# Patient Record
Sex: MALE | Race: WHITE
[De-identification: names, ages, dates, MRNs, and addresses within clinical notes are randomized per-mention and may not be internally consistent; named-entity substitution may affect disease eponyms.]

---

## 2017-02-07 ENCOUNTER — HOSPITAL ENCOUNTER (EMERGENCY)
Dept: HOSPITAL 53 - M ED | Age: 32
Discharge: HOME | End: 2017-02-07
Payer: COMMERCIAL

## 2017-02-07 ENCOUNTER — HOSPITAL ENCOUNTER (EMERGENCY)
Dept: HOSPITAL 53 - M ED | Age: 32
LOS: 1 days | Discharge: HOME | End: 2017-02-08
Payer: COMMERCIAL
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
## 2017-02-07 DIAGNOSIS — S60.222D: Primary | ICD-10-CM

## 2017-02-07 DIAGNOSIS — Z79.899: ICD-10-CM
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
## 2017-02-07 DIAGNOSIS — Q35.9: ICD-10-CM

## 2017-02-07 DIAGNOSIS — F17.210: ICD-10-CM

## 2017-02-07 DIAGNOSIS — F32.9: ICD-10-CM

## 2017-02-07 DIAGNOSIS — G40.909: ICD-10-CM
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
## 2017-02-07 DIAGNOSIS — F41.9: ICD-10-CM
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
## 2017-02-07 DIAGNOSIS — S60.222A: ICD-10-CM

## 2017-02-07 DIAGNOSIS — Z88.8: ICD-10-CM

## 2017-02-07 DIAGNOSIS — S67.22XA: Primary | ICD-10-CM

## 2017-02-07 NOTE — EDDOCDS
Nurse's Notes                                                                                     

[facility]                                                                         

Name: Og Segal                                                                                  

Age: 31 yrs                                                                                       

Sex: Male                                                                                         

: 1985                                                                                   

MRN: [id number]                                                                                     

Arrival Date: 2017                                                                          

Time: 15:37                                                                                       

Account#: [id number]                                                                              

Bed I8 / 16                                                                                       

Private MD:                                                                                       

Diagnosis: Crushing injury of hand-left;Contusion of left hand                                    

                                                                                                  

Presentation:                                                                                     

                                                                                             

15:53 Presenting complaint: Patient states: did not answer on first call to triage.           hs1 

16:10 Presenting complaint: answered from outside where patient was smoking. Patient had a    hs1 

      cinder block accidentally fall on him. Adult Sepsis Screening: The patient does not         

      have new or worsening altered mentation. Patient's respiratory rate is less than 22.        

      Systolic blood pressure is greater than 100. Patient has a qSOFA score of 0- Negative       

      Sepsis Screen. Suicide/Homicide risk assessment- the patient denies having any suicidal     

      and/or homicidal ideations and does not present with any other emotional, behavioral or     

      mental health complaints.  Status: Patient is not a  or       

      dependent. Transition of care: patient was not received from another setting of care.       

16:10 Acuity: DAIN Level 4                                                                     hs1 

16:14 Method Of Arrival: Ambulance                                                            hs1 

                                                                                                  

Triage Assessment:                                                                                

16:13 General: Appears uncomfortable, Behavior is appropriate for age, cooperative. Pain:     hs1 

      Location: left hand Pain currently is 9 out of 10 on a pain scale. HIV screening NA for     

      this visit Offered previously. Musculoskeletal: Range of motion limited in MCP of left      

      ring finger, MCP of left middle finger and MCP of left index finger Swelling present in     

      left hand. Injury Description: Crush injury sustained to left hand.                         

                                                                                                  

Historical:                                                                                       

- Allergies: no known allergies;                                                                  

- Home Meds:                                                                                      

1. clonazepam 0.5 mg Oral tab 1 tab 2 times per day                                             

2. Prozac 40 mg Oral cap 1 cap once daily                                                       

3. Tylenol 325 mg Oral tab 1 tab every 4 hours                                                  

     (Last dose: 2017 14:00)                                                                

- PMHx: Anxiety; Cleft Palate; collapsed lung; Depression; Seizure Disorder;                      

- PSHx: Chest Tube- Left; cleft palate repair;                                                    

- Social history: Smoking status: Patient uses tobacco products, heavy tobacco smoker.            

No barriers to communication noted, The patient speaks fluent English, Speaks                   

appropriately for age.                                                                          

- Family history: Not pertinent.                                                                  

- : The pt / caregiver states he / she is not on anticoagulants. Home medication list             

is obtained from the patient.                                                                   

- Exposure Risk Screening:: None identified.                                                      

                                                                                                  

                                                                                                  

Screenin:04 Screening information is obtained from the patient. Fall risk: No risks identified.     ck1 

      Assistance ADL's: requires no assistance with activities of daily living. Abuse/DV          

      Screen: The patient / caregiver reports he/she is: not in a situation that causes fear,     

      pain or injury. Nutritional screening: No deficits noted. Advance Directives:               

      Currently, there is no health care proxy. home support is adequate.                         

                                                                                                  

Assessment:                                                                                       

17:04 General: Appears in no apparent distress, comfortable, Behavior is appropriate for age, ck1 

      cooperative. Pain: Location: left hand Pain currently is 10 out of 10 on a pain scale.      

      Neurological: Level of Consciousness is awake, alert, obeys commands. Derm: Skin is         

      pink, warm & dry. Musculoskeletal: Circulation, motion, and sensation intact Range of     

      motion intact in all extremities.                                                           

                                                                                                  

Vital Signs:                                                                                      

15:42  / 77; Pulse 98; Resp 16; Temp 98.9; Pulse Ox 99% ; Weight 68.04 kg; Height 5 ft. elp 

      1 in. (154.94 cm);                                                                          

17:23  / 73; Pulse 92; Resp 18; Temp 98.0; Pulse Ox 98% ; Pain 8/10;                    jam1

15:42 Body Mass Index 28.34 (68.04 kg, 154.94 cm)                                             elp 

                                                                                                  

Vitals:                                                                                           

15:42 Log In Time N/A - ambulance arrival.                                                    elp 

                                                                                                  

ED Course:                                                                                        

15:41 Patient visited by Ruthie Avitia PCA.                                                  elp 

15:41 Patient moved to Waiting                                                                elp 

15:42 Patient moved to Pre RCE                                                                elp 

16:11 Triage Initiated                                                                        hs1 

16:36 [facility]                                                            le  

16:36 Patient moved to I8 / 16                                                                kcs 

16:39 Patient visited by Melly Almanza,ECHO.                                              ck1 

16:40 Patient visited by Chantal Huang FNP.                                                 le  

16:40 Patient visited by Chantal Huang FNP.                                                 le  

17:03 Patient visited by Melly Almanza,ECHO.                                              ck1 

17:04 The patient / caregiver is instructed regarding the plan of care and ED course.         ck1 

17:04 No IV's were initiated during this patient's visit. No procedures done that require     ck1 

      assistance.                                                                                 

17:20 Hand, Complete Returned.                                                                EDMS

17:42 Your own Physician is Referral Physician.                                               bobo  

17:50 El Rodriguez DO is Attending Physician.                                               cs11

17:54 Ace wrap to left hand. Patient has positive distal pulse, brisk capillary refill, and   jc4 

      positive sensation after application.                                                       

                                                                                                  

Administered Medications:                                                                         

17:04 Drug: morphine 5 mg [morphine 10 mg/mL injection solution (0.5 mL)] Route: IM; Site:    ck1 

      right deltoid;                                                                              

                                                                                                  

                                                                                                  

Order Results:                                                                                    

                                                                                                  

Radiology Order: Hand, Complete                                                                   

      Test: Hand, Complete                                                                        

      REASON FOR EXAMINATION: crush inj, metacarpals;Trauma; Left hand four views:; ; There       

      is soft tissue edema over the dorsum.; ; There is no fracture or dislocation. No            

      calcifications or foreign bodies.; ; ; Signed by; Chace Traore MD 2017 04:59 P;      

Outcome:                                                                                          

17:43 Discharge ordered by Provider.                                                          le  

17:55 Discharge Assessment: Patient awake, alert and oriented x 3. No cognitive and/or        jc4 

      functional deficits noted. Patient verbalized understanding of disposition                  

      instructions. patient administered narcotics - yes. Pt provided with safe discharge.        

      The following High Risk Discharge criteria are identified: None. Discharged to home         

      ambulatory, via [place]. Condition: stable. Discharge instructions given to patient,     

      Instructed on discharge instructions, follow up and referral plans. medication usage,       

      no driving heavy equipment, Demonstrated understanding of instructions, medications, Pt     

      was receptive of discharge instructions/ teaching. No special radiology studies were        

      completed. Property :Personal belongings accompany Pt.                                      

17:59 Patient left the ED.                                                                    jc4 

                                                                                                  

Signatures:                                                                                       

Dispatcher [employer]                           EDMS                                                 

Kristen Portillo RN                      RN   Marta Warner, PCA                       PCA  jam1                                                 

Melly AlmanzaRN                  RN   ck1                                                  

Chantal Huang, FNP                     FNP  Tika Morrison RN                    RN   hs1                                                  

Toshia García RN                    RN   jc4                                                  

El Rodriguez DO DO   cs11                                                 

Patchen, Ruthie, PCA                      PCA  elp                                                  

                                                                                                  

Corrections: (The following items were deleted from the chart)                                    

16:14 16:10 Method Of Arrival: Walkin/Carried/Asstd hs1                                       hs1 

                                                                                                  

**************************************************************************************************

MTDD

## 2017-02-07 NOTE — EDDOCDS
Physician Documentation                                                                           

[facility]                                                                         

Name: Og Segal                                                                                  

Age: 31 yrs                                                                                       

Sex: Male                                                                                         

: 1985                                                                                   

MRN: [id number]                                                                                     

Arrival Date: 2017                                                                          

Time: 15:37                                                                                       

Account#: [id number]                                                                              

Bed I8 / 16                                                                                       

Private MD:                                                                                       

Disposition:                                                                                      

17 17:43 Discharged to Home/Self Care. Impression: Crushing injury of hand - left,          

Contusion of left hand.                                                                         

- Condition is Stable.                                                                            

- Discharge Instructions: Elastic Bandage and RICE, Hand Contusion, Crush Injury,                 

Fingers or Toes.                                                                                

- Prescriptions for oxycodone 5 mg Oral Tablet - take 1 tablet by ORAL route every 4              

hours As needed MDD: 6 tabs; 20 tablet.                                                         

- Medication Reconciliation, Local Pharmacy Hours form.                                           

- Follow up: Your own Physician; When: 1 week; Reason: Recheck today's complaints,                

Continuance of care, If not improving.                                                          

- Problem is new.                                                                                 

- Symptoms have improved.                                                                         

                                                                                                  

                                                                                                  

                                                                                                  

Historical:                                                                                       

- Allergies: no known allergies;                                                                  

- Home Meds:                                                                                      

1. clonazepam 0.5 mg Oral tab 1 tab 2 times per day                                             

2. Prozac 40 mg Oral cap 1 cap once daily                                                       

3. Tylenol 325 mg Oral tab 1 tab every 4 hours                                                  

     (Last dose: 2017 14:00)                                                                

- PMHx: Anxiety; Cleft Palate; collapsed lung; Depression; Seizure Disorder;                      

- PSHx: Chest Tube- Left; cleft palate repair;                                                    

- Social history: Smoking status: Patient uses tobacco products, heavy tobacco smoker.            

No barriers to communication noted, The patient speaks fluent English, Speaks                   

appropriately for age.                                                                          

- Family history: Not pertinent.                                                                  

- : The pt / caregiver states he / she is not on anticoagulants. Home medication list             

is obtained from the patient.                                                                   

- Exposure Risk Screening:: None identified.                                                      

                                                                                                  

                                                                                                  

Vital Signs:                                                                                      

                                                                                             

15:42  / 77; Pulse 98; Resp 16; Temp 98.9; Pulse Ox 99% ; Weight 68.04 kg / 150 lbs;    elp 

      Height 5 ft. 1 in. (154.94 cm);                                                             

17:23  / 73; Pulse 92; Resp 18; Temp 98.0; Pulse Ox 98% ; Pain 8/10;                    jam1

15:42 Body Mass Index 28.34 (68.04 kg, 154.94 cm)                                             elp 

                                                                                                  

MDM:                                                                                              

16:46 morphine 5 mg IM once ordered.                                                          le  

16:49 Hand, Complete Ordered.                                                                 EDMS

17:48 Financial registration complete.                                                        ks16

17:54 Ace Wrap ordered.                                                                       jc4 

                                                                                                  

Administered Medications:                                                                         

17:04 Drug: morphine 5 mg [morphine 10 mg/mL injection solution (0.5 mL)] Route: IM; Site:    ck1 

      right deltoid;                                                                              

                                                                                                  

                                                                                                  

Signatures:                                                                                       

Dispatcher [employer]                           EDMS                                                 

Melly Almanza RN                  RN   ck1                                                  

Chantal Huang, EVERETTP                     FNP  Tika Morrison RN                    RN   hs1                                                  

Toshia García RN                    RN   jc4                                                  

Cira Blake, Reg                 Reg  ks16                                                 

                                                                                                  

**************************************************************************************************

MTDD

## 2017-02-07 NOTE — REP
Left hand four views:

 

There is soft tissue edema over the dorsum.

 

There is no fracture or dislocation.  No calcifications or foreign bodies.

 

 

Signed by

Chace Traore MD 02/07/2017 04:59 P

## 2017-02-08 NOTE — EDDOCDS
Nurse's Notes                                                                                     

[facility]                                                                         

Name: Og Segal                                                                                  

Age: 31 yrs                                                                                       

Sex: Male                                                                                         

: 1985                                                                                   

MRN: [id number]                                                                                     

Arrival Date: 2017                                                                          

Time: 23:47                                                                                       

Account#: [id number]                                                                              

Bed D1                                                                                            

Private MD: Luciano Veronica                                                                          

Diagnosis: Contusion of left hand                                                                 

                                                                                                  

Presentation:                                                                                     

                                                                                             

23:56 Presenting complaint: Patient states: seen here earlier and diagnosed with chipped      dsf 

      fractures. Since discharge pt states the swelling and pain has gotten worse. pt did not     

       pain medication from pharmacy today because he had no ride. Adult Sepsis            

      Screening: The patient does not have new or worsening altered mentation. Patient's          

      respiratory rate is less than 22. Systolic blood pressure is greater than 100. Patient      

      has a qSOFA score of 0- Negative Sepsis Screen. Suicide/Homicide risk assessment- the       

      patient denies having any suicidal and/or homicidal ideations and does not present with     

      any other emotional, behavioral or mental health complaints.  Status: Patient       

      is not a  or  dependent. Transition of care: patient was not          

      received from another setting of care.                                                      

23:56 Acuity: DAIN Level 4                                                                     dsf 

23:56 Method Of Arrival: Walkin/Carried/Asstd                                                 dsf 

                                                                                                  

Triage Assessment:                                                                                

23:59 General: Appears in no apparent distress, Behavior is appropriate for age, cooperative. dsf 

      Pain: Location: left hand Pain currently is 9 out of 10 on a pain scale. Quality of         

      pain is described as unbearable. HIV screening NA for this visit Offered previously.        

      Musculoskeletal: redness and swelling to left hand.                                         

                                                                                                  

Historical:                                                                                       

- Allergies: Toradol (Vomit);                                                                     

- Home Meds:                                                                                      

1. clonazepam 0.5 mg Oral tab 1 tab 2 times per day                                             

     (Last dose: 2017 09:00)                                                                

2. Prozac 40 mg Oral cap 1 cap once daily                                                       

     (Last dose: 2017 07:00)                                                                

3. Tylenol 325 mg Oral tab 1 tab every 4 hours                                                  

     (Last dose: Unknown)                                                                         

4. oxycodone 5 mg Oral tab 1 tab every 4 hours unable to  from the pharmacy              

- PMHx: Anxiety; Cleft Palate; collapsed lung; Depression; Seizure Disorder;                      

- PSHx: Chest Tube- Left; cleft palate repair;                                                    

- Social history: Smoking status: Patient uses tobacco products, current every day                

smoker. No barriers to communication noted, The patient speaks fluent English, Speaks           

appropriately for age.                                                                          

- Family history: Not pertinent.                                                                  

- : The pt / caregiver states he / she is not on anticoagulants. Home medication list             

is obtained from the patient.                                                                   

- Exposure Risk Screening:: None identified.                                                      

                                                                                                  

                                                                                                  

Screenin                                                                                             

01:57 Screening information is obtained from the patient. Fall risk: No risks identified.     mlc 

      Assistance ADL's: requires no assistance with activities of daily living. Abuse/DV          

      Screen: The patient / caregiver reports he/she is: not in a situation that causes fear,     

      pain or injury. Nutritional screening: No deficits noted. Advance Directives:               

      Currently, there is no health care proxy. home support is adequate.                         

                                                                                                  

Assessment:                                                                                       

01:57 General: Appears in no apparent distress, comfortable, Behavior is cooperative. Pain:   mlc 

      Location: left hand. Neurological: Level of Consciousness is awake, alert, Oriented to      

      person, place, time. Respiratory: Airway is patent Respiratory effort is even,              

      unlabored, Respiratory pattern is regular. Derm: Skin is pink, warm & dry. Swollen area   

      noted on dorsum of left hand. Musculoskeletal: Circulation, motion, and sensation           

      intact Capillary refill < 3 seconds in left fingers.                                        

                                                                                                  

Vital Signs:                                                                                      

                                                                                             

23:49  / 78; Pulse 99; Resp 18 S; Temp 98.3(O); Pulse Ox 97% on R/A; Weight 68.95 kg    gr2 

      (R); Height 5 ft. 2 in. (157.48 cm) (R); Pain 8/10;                                         

23:49 Body Mass Index 27.80 (68.95 kg, 157.48 cm)                                             gr2 

                                                                                                  

Vitals:                                                                                           

23:49 Log In Time: 2017 at 23:49.                                                gr2 

                                                                                                  

ED Course:                                                                                        

23:49 Patient visited by Gogo Nance.                                                   gr2 

23:49 Luciano Veronica is Private Physician.                                                      gr2 

23:49 Patient moved to Waiting                                                                gr2 

23:50 Patient visited by Gogo Nance.                                                   gr2 

23:50 Patient moved to Pre RCE                                                                gr2 

23:57 Triage Initiated                                                                        dsf 

02                                                                                             

00:17 Patient moved to MTA Wait                                                               dsf 

01:18 [facility] Payment Agreement was scanned into [employer] and attached to record.               hs2 

01:21 Patient moved to I5 / M5                                                                ajs 

01:35 Garrett Carter RPA-C is PHCP.                                                   ck7 

01:35 Mario Houston MD is Attending Physician.                                               ck7 

01:35 Patient visited by Garrett Carter RPA-C.                                        ck7 

01:43 [facility] is Referral Physician.                                  ck7 

01:57 The patient / caregiver is instructed regarding the plan of care and ED course.         mlc 

01:57 No IV's were initiated during this patient's visit. No procedures done that require     mlc 

      assistance.                                                                                 

02:21 Patient moved to D1                                                                     mlc 

                                                                                                  

Administered Medications:                                                                         

01:50 Drug: oxyCODONE 5 mg [oxycodone 5 mg tablet (1 tabs)] Route: PO;                        mlc 

02:45 Follow up: Response: Med's dispensed home                                               mlc 

02:44 CANCELLED (Other Intervention Used): oxyCODONE 5 mg PO once                             ck7 

                                                                                                  

                                                                                                  

Order Results:                                                                                    

There are currently no results for this order.                                                    

Outcome:                                                                                          

01:43 Discharge ordered by Provider.                                                          ck7 

01:57 Discharge Assessment: Patient awake, alert and oriented x 3. No cognitive and/or        mlc 

      functional deficits noted. Patient verbalized understanding of disposition                  

      instructions. patient administered narcotics - yes. Pt provided with safe discharge.        

      The following High Risk Discharge criteria are identified: None. Discharged to home         

      ambulatory. Condition: stable. Discharge instructions given to patient, Instructed on       

      discharge instructions, follow up and referral plans. Demonstrated understanding of         

      instructions, Pt was receptive of discharge instructions/ teaching. No special              

      radiology studies were completed. Property sent home with patient.                          

02:45 Patient left the ED.                                                                    [facility] 

                                                                                                  

Signatures:                                                                                       

Senia Sibley RN                       RN   Cathleen Nieto Christopher, RPA-C            [facility]Cck[facility]                                                  

Gogo Nance                            gr2                                                  

Roya Denny RN                          RN   Myrtle Pinto, Reg                   Reg  hs2                                                  

                                                                                                  

Corrections: (The following items were deleted from the chart)                                    

00:11 02/07 23:56 Presenting complaint: Patient states: seen here earlier and diagnosed with  dsf 

      chipped fractures. Since discharge pt states the swelling and pain has gotten worse dsf     

                                                                                                  

**************************************************************************************************

MTDD

## 2017-02-08 NOTE — EDDOCDS
Physician Documentation                                                                           

[facility]                                                                         

Name: Og Segal                                                                                  

Age: 31 yrs                                                                                       

Sex: Male                                                                                         

: 1985                                                                                   

MRN: [id number]                                                                                     

Arrival Date: 2017                                                                          

Time: 23:47                                                                                       

Account#: [id number]                                                                              

Bed D1                                                                                            

Private MD: Luciano Veronica                                                                          

Disposition:                                                                                      

17 01:43 Discharged to Home/Self Care. Impression: Contusion of left hand.                  

- Condition is Stable.                                                                            

- Discharge Instructions: Hand Contusion.                                                         

                                                                                                  

- Medication Reconciliation, Local Pharmacy Hours, Work Release Form - 2 day form.                

- Follow up: [facility]; When: 1 - 2 days; Reason: Recheck today's           

complaints, Continuance of care.                                                                

- Problem is new.                                                                                 

- Symptoms have improved.                                                                         

- Notes: USE THE PAIN MEDICATION THAT WAS PRESCRIBED TO YOU EARLIER AS INSTRUCTED, PICK           

UP WITH SCRIPT AT THE PHARMACY TOMORROW, USE THE ACE WRAP THAT WAS GIVEN TO YOU, USE            

ICE TO THE AREA, FOLLOW UP WITH [facility]                                       

                                                                                                  

                                                                                                  

Historical:                                                                                       

- Allergies: Toradol (Vomit);                                                                     

- Home Meds:                                                                                      

1. clonazepam 0.5 mg Oral tab 1 tab 2 times per day                                             

     (Last dose: 2017 09:00)                                                                

2. Prozac 40 mg Oral cap 1 cap once daily                                                       

     (Last dose: 2017 07:00)                                                                

3. Tylenol 325 mg Oral tab 1 tab every 4 hours                                                  

     (Last dose: Unknown)                                                                         

4. oxycodone 5 mg Oral tab 1 tab every 4 hours unable to  from the pharmacy              

- PMHx: Anxiety; Cleft Palate; collapsed lung; Depression; Seizure Disorder;                      

- PSHx: Chest Tube- Left; cleft palate repair;                                                    

- Social history: Smoking status: Patient uses tobacco products, current every day                

smoker. No barriers to communication noted, The patient speaks fluent English, Speaks           

appropriately for age.                                                                          

- Family history: Not pertinent.                                                                  

- : The pt / caregiver states he / she is not on anticoagulants. Home medication list             

is obtained from the patient.                                                                   

- Exposure Risk Screening:: None identified.                                                      

                                                                                                  

                                                                                                  

Vital Signs:                                                                                      

                                                                                             

23:49  / 78; Pulse 99; Resp 18 S; Temp 98.3(O); Pulse Ox 97% on R/A; Weight 68.95 kg /  gr2 

      152.01 lbs (R); Height 5 ft. 2 in. (157.48 cm) (R); Pain 8/10;                              

23:49 Body Mass Index 27.80 (68.95 kg, 157.48 cm)                                             gr2 

                                                                                                  

MDM:                                                                                              

                                                                                             

01:18 [facility] Payment Agreement was scanned into [employer] and attached to record.               hs2 

01:37 Financial registration complete.                                                        hs2 

02:44 oxyCODONE 5 mg PO once; DISPENSE TO GO HOME WITH ordered.                               ck7 

                                                                                                  

Administered Medications:                                                                         

01:50 Drug: oxyCODONE 5 mg [oxycodone 5 mg tablet (1 tabs)] Route: PO;                        mlc 

02:45 Follow up: Response: Med's dispensed home                                               mlc 

02:44 CANCELLED (Other Intervention Used): oxyCODONE 5 mg PO once                             ck7 

                                                                                                  

                                                                                                  

Signatures:                                                                                       

Senia SibleyRN                       RN   Garrett Rutherford RPA-C            R[facility]-Cck7                                                  

Roya DennyRN                          RN   mlc                                                  

Myrtle Trejo, Reg                   Reg  hs2                                                  

                                                                                                  

The chart was reviewed and I authenticate all verbal orders and agree with the evaluation and 
treatment provided.Corrections: (The following items were deleted from the chart)

02:44 01:42 oxyCODONE 5 mg PO once ordered. ck7                                               ck7 

02:44 02:44 oxyCODONE 5 mg PO once ordered. ck7                                               ck7 

                                                                                                  

Attachments:                                                                                      

01:18 [facility] Payment Agreement                                                                hs2 

                                                                                                  

**************************************************************************************************

MTDD

## 2017-02-09 NOTE — EDDOCDS
Physician Documentation                                                                           

[facility]                                                                         

Name: Og Segal                                                                                  

Age: 31 yrs                                                                                       

Sex: Male                                                                                         

: 1985                                                                                   

MRN: [id number]                                                                                     

Arrival Date: 2017                                                                          

Time: 15:37                                                                                       

Account#: [id number]                                                                              

Bed I8 / 16                                                                                       

Private MD:                                                                                       

Disposition:                                                                                      

17 17:43 Discharged to Home/Self Care. Impression: Crushing injury of hand - left,          

Contusion of left hand.                                                                         

- Condition is Stable.                                                                            

- Discharge Instructions: Elastic Bandage and RICE, Hand Contusion, Crush Injury,                 

Fingers or Toes.                                                                                

- Prescriptions for oxycodone 5 mg Oral Tablet - take 1 tablet by ORAL route every 4              

hours As needed MDD: 6 tabs; 20 tablet.                                                         

- Medication Reconciliation, Local Pharmacy Hours form.                                           

- Follow up: Your own Physician; When: 1 week; Reason: Recheck today's complaints,                

Continuance of care, If not improving.                                                          

- Problem is new.                                                                                 

- Symptoms have improved.                                                                         

                                                                                                  

                                                                                                  

                                                                                                  

Historical:                                                                                       

- Allergies: no known allergies;                                                                  

- Home Meds:                                                                                      

1. clonazepam 0.5 mg Oral tab 1 tab 2 times per day                                             

2. Prozac 40 mg Oral cap 1 cap once daily                                                       

3. Tylenol 325 mg Oral tab 1 tab every 4 hours                                                  

     (Last dose: 2017 14:00)                                                                

- PMHx: Anxiety; Cleft Palate; collapsed lung; Depression; Seizure Disorder;                      

- PSHx: Chest Tube- Left; cleft palate repair;                                                    

- Social history: Smoking status: Patient uses tobacco products, heavy tobacco smoker.            

No barriers to communication noted, The patient speaks fluent English, Speaks                   

appropriately for age.                                                                          

- Family history: Not pertinent.                                                                  

- : The pt / caregiver states he / she is not on anticoagulants. Home medication list             

is obtained from the patient.                                                                   

- Exposure Risk Screening:: None identified.                                                      

                                                                                                  

                                                                                                  

Vital Signs:                                                                                      

                                                                                             

15:42  / 77; Pulse 98; Resp 16; Temp 98.9; Pulse Ox 99% ; Weight 68.04 kg / 150 lbs;    elp 

      Height 5 ft. 1 in. (154.94 cm);                                                             

17:23  / 73; Pulse 92; Resp 18; Temp 98.0; Pulse Ox 98% ; Pain 8/10;                    jam1

15:42 Body Mass Index 28.34 (68.04 kg, 154.94 cm)                                             elp 

                                                                                                  

MDM:                                                                                              

16:46 morphine 5 mg IM once ordered.                                                          le  

16:49 Hand, Complete Ordered.                                                                 EDMS

17:48 Financial registration complete.                                                        [facility]

17:54 Ace Wrap ordered.                                                                       jc4 

20:13 [facility] Payment Agreement was scanned into [employer] and attached to record.               ks                                                                                             

13:39 T-Sheet-- Draft Copy was scanned into [employer] and attached to record.                   gb  

                                                                                                  

Administered Medications:                                                                         

                                                                                             

17:04 Drug: morphine 5 mg [morphine 10 mg/mL injection solution (0.5 mL)] Route: IM; Site:    ck1 

      right deltoid;                                                                              

                                                                                                  

                                                                                                  

Signatures:                                                                                       

Dispatcher [employer]                           EDMS                                                 

Susy Del Real, Reg                  Reg  gb                                                   

Melly AlmanzaRN                  RN   ck1                                                  

Chantal Huang, EVERETTP                     FNP  Tika Morrison RN                    RN   hs1                                                  

Toshia García RN                    RN   jc4                                                  

Cira Blake, Reg                 Reg  ks16                                                 

                                                                                                  

The chart was reviewed and I authenticate all verbal orders and agree with the evaluation and 
treatment provided.Attachments:

20:13 [facility] Payment Agreement                                                                [facility]

                                                                                             

13:39 T-Sheet-- Draft Copy                                                                    gb  

                                                                                                  

**************************************************************************************************



*** Chart Complete ***
MTDD

## 2017-02-09 NOTE — EDDOCDS
Nurse's Notes                                                                                     

[facility]                                                                         

Name: Og Segal                                                                                  

Age: 31 yrs                                                                                       

Sex: Male                                                                                         

: 1985                                                                                   

MRN: [id number]                                                                                     

Arrival Date: 2017                                                                          

Time: 15:37                                                                                       

Account#: [id number]                                                                              

Bed I8 / 16                                                                                       

Private MD:                                                                                       

Diagnosis: Crushing injury of hand-left;Contusion of left hand                                    

                                                                                                  

Presentation:                                                                                     

                                                                                             

15:53 Presenting complaint: Patient states: did not answer on first call to triage.           hs1 

16:10 Presenting complaint: answered from outside where patient was smoking. Patient had a    hs1 

      cinder block accidentally fall on him. Adult Sepsis Screening: The patient does not         

      have new or worsening altered mentation. Patient's respiratory rate is less than 22.        

      Systolic blood pressure is greater than 100. Patient has a qSOFA score of 0- Negative       

      Sepsis Screen. Suicide/Homicide risk assessment- the patient denies having any suicidal     

      and/or homicidal ideations and does not present with any other emotional, behavioral or     

      mental health complaints.  Status: Patient is not a  or       

      dependent. Transition of care: patient was not received from another setting of care.       

16:10 Acuity: DAIN Level 4                                                                     hs1 

16:14 Method Of Arrival: Ambulance                                                            hs1 

                                                                                                  

Triage Assessment:                                                                                

16:13 General: Appears uncomfortable, Behavior is appropriate for age, cooperative. Pain:     hs1 

      Location: left hand Pain currently is 9 out of 10 on a pain scale. HIV screening NA for     

      this visit Offered previously. Musculoskeletal: Range of motion limited in MCP of left      

      ring finger, MCP of left middle finger and MCP of left index finger Swelling present in     

      left hand. Injury Description: Crush injury sustained to left hand.                         

                                                                                                  

Historical:                                                                                       

- Allergies: no known allergies;                                                                  

- Home Meds:                                                                                      

1. clonazepam 0.5 mg Oral tab 1 tab 2 times per day                                             

2. Prozac 40 mg Oral cap 1 cap once daily                                                       

3. Tylenol 325 mg Oral tab 1 tab every 4 hours                                                  

     (Last dose: 2017 14:00)                                                                

- PMHx: Anxiety; Cleft Palate; collapsed lung; Depression; Seizure Disorder;                      

- PSHx: Chest Tube- Left; cleft palate repair;                                                    

- Social history: Smoking status: Patient uses tobacco products, heavy tobacco smoker.            

No barriers to communication noted, The patient speaks fluent English, Speaks                   

appropriately for age.                                                                          

- Family history: Not pertinent.                                                                  

- : The pt / caregiver states he / she is not on anticoagulants. Home medication list             

is obtained from the patient.                                                                   

- Exposure Risk Screening:: None identified.                                                      

                                                                                                  

                                                                                                  

Screenin:04 Screening information is obtained from the patient. Fall risk: No risks identified.     ck1 

      Assistance ADL's: requires no assistance with activities of daily living. Abuse/DV          

      Screen: The patient / caregiver reports he/she is: not in a situation that causes fear,     

      pain or injury. Nutritional screening: No deficits noted. Advance Directives:               

      Currently, there is no health care proxy. home support is adequate.                         

                                                                                                  

Assessment:                                                                                       

17:04 General: Appears in no apparent distress, comfortable, Behavior is appropriate for age, ck1 

      cooperative. Pain: Location: left hand Pain currently is 10 out of 10 on a pain scale.      

      Neurological: Level of Consciousness is awake, alert, obeys commands. Derm: Skin is         

      pink, warm & dry. Musculoskeletal: Circulation, motion, and sensation intact Range of     

      motion intact in all extremities.                                                           

                                                                                                  

Vital Signs:                                                                                      

15:42  / 77; Pulse 98; Resp 16; Temp 98.9; Pulse Ox 99% ; Weight 68.04 kg; Height 5 ft. elp 

      1 in. (154.94 cm);                                                                          

17:23  / 73; Pulse 92; Resp 18; Temp 98.0; Pulse Ox 98% ; Pain 8/10;                    jam1

15:42 Body Mass Index 28.34 (68.04 kg, 154.94 cm)                                             elp 

                                                                                                  

Vitals:                                                                                           

15:42 Log In Time N/A - ambulance arrival.                                                    elp 

                                                                                                  

ED Course:                                                                                        

15:41 Patient visited by Ruthie Avitia PCA.                                                  elp 

15:41 Patient moved to Waiting                                                                elp 

15:42 Patient moved to Pre RCE                                                                elp 

16:11 Triage Initiated                                                                        hs1 

16:36 [facility]                                                            le  

16:36 Patient moved to I8 / 16                                                                kcs 

16:39 Patient visited by Melly Almanza,ECHO.                                              ck1 

16:40 Patient visited by Chantal Huang FNP.                                                 le  

16:40 Patient visited by Chantal Huang FNP.                                                 le  

17:03 Patient visited by Melly Almazna,ECHO.                                              ck1 

17:04 The patient / caregiver is instructed regarding the plan of care and ED course.         ck1 

17:04 No IV's were initiated during this patient's visit. No procedures done that require     ck1 

      assistance.                                                                                 

17:20 Hand, Complete Returned.                                                                EDMS

17:42 Your own Physician is Referral Physician.                                               le  

17:50 El Rodriguez DO is Attending Physician.                                               cs11

17:54 Ace wrap to left hand. Patient has positive distal pulse, brisk capillary refill, and   jc4 

      positive sensation after application.                                                       

20:13 [facility] Payment Agreement was scanned into [employer] and attached to record.               ks16

                                                                                             

13:39 T-Sheet-- Draft Copy was scanned into [employer] and attached to record.                   gb  

                                                                                                  

Administered Medications:                                                                         

                                                                                             

17:04 Drug: morphine 5 mg [morphine 10 mg/mL injection solution (0.5 mL)] Route: IM; Site:    ck1 

      right deltoid;                                                                              

                                                                                                  

                                                                                                  

Order Results:                                                                                    

                                                                                                  

Radiology Order: Hand, Complete                                                                   

      Test: Hand, Complete                                                                        

      REASON FOR EXAMINATION: crush inj, metacarpals;Trauma; Left hand four views:; ; There       

      is soft tissue edema over the dorsum.; ; There is no fracture or dislocation. No            

      calcifications or foreign bodies.; ; ; Signed by; Chace Traore MD 2017 04:59 P;      

Outcome:                                                                                          

17:43 Discharge ordered by Provider.                                                          le  

17:55 Discharge Assessment: Patient awake, alert and oriented x 3. No cognitive and/or        jc4 

      functional deficits noted. Patient verbalized understanding of disposition                  

      instructions. patient administered narcotics - yes. Pt provided with safe discharge.        

      The following High Risk Discharge criteria are identified: None. Discharged to home         

      ambulatory, via [place]. Condition: stable. Discharge instructions given to patient,     

      Instructed on discharge instructions, follow up and referral plans. medication usage,       

      no driving heavy equipment, Demonstrated understanding of instructions, medications, Pt     

      was receptive of discharge instructions/ teaching. No special radiology studies were        

      completed. Property :Personal belongings accompany Pt.                                      

17:59 Patient left the ED.                                                                    jc4 

                                                                                                  

Signatures:                                                                                       

Dispatcher [employer]                           EDMS                                                 

Kristen Portillo RN                      RN   Marta Warner, PCA                       PCA  jam1                                                 

Susy Del Real, Reg                  Reg  Melly BooRN                  RN   ck1                                                  

Chantal Huang, FNP                     FNP  Tika Morrison RN                    RN   hs1                                                  

Toshia García RN                    RN   jc4                                                  

El Rodriguez DO                       DO   cs11                                                 

Ruthie Avitia, PCA                      PCA  elp                                                  

Cira Blake, Reg                 Reg  ks16                                                 

                                                                                                  

Corrections: (The following items were deleted from the chart)                                    

16:14 16:10 Method Of Arrival: Walkin/Carried/Asstd hs1                                       hs1 

                                                                                                  

**************************************************************************************************



*** Chart Complete ***
MTDD

## 2017-02-09 NOTE — EDDOCDS
Physician Documentation                                                                           

[facility]                                                                         

Name: Og Segal                                                                                  

Age: 31 yrs                                                                                       

Sex: Male                                                                                         

: 1985                                                                                   

MRN: [id number]                                                                                     

Arrival Date: 2017                                                                          

Time: 15:37                                                                                       

Account#: [id number]                                                                              

Bed I8 / 16                                                                                       

Private MD:                                                                                       

Disposition:                                                                                      

17 17:43 Discharged to Home/Self Care. Impression: Crushing injury of hand - left,          

Contusion of left hand.                                                                         

- Condition is Stable.                                                                            

- Discharge Instructions: Elastic Bandage and RICE, Hand Contusion, Crush Injury,                 

Fingers or Toes.                                                                                

- Prescriptions for oxycodone 5 mg Oral Tablet - take 1 tablet by ORAL route every 4              

hours As needed MDD: 6 tabs; 20 tablet.                                                         

- Medication Reconciliation, Local Pharmacy Hours form.                                           

- Follow up: Your own Physician; When: 1 week; Reason: Recheck today's complaints,                

Continuance of care, If not improving.                                                          

- Problem is new.                                                                                 

- Symptoms have improved.                                                                         

                                                                                                  

                                                                                                  

                                                                                                  

Historical:                                                                                       

- Allergies: no known allergies;                                                                  

- Home Meds:                                                                                      

1. clonazepam 0.5 mg Oral tab 1 tab 2 times per day                                             

2. Prozac 40 mg Oral cap 1 cap once daily                                                       

3. Tylenol 325 mg Oral tab 1 tab every 4 hours                                                  

     (Last dose: 2017 14:00)                                                                

- PMHx: Anxiety; Cleft Palate; collapsed lung; Depression; Seizure Disorder;                      

- PSHx: Chest Tube- Left; cleft palate repair;                                                    

- Social history: Smoking status: Patient uses tobacco products, heavy tobacco smoker.            

No barriers to communication noted, The patient speaks fluent English, Speaks                   

appropriately for age.                                                                          

- Family history: Not pertinent.                                                                  

- : The pt / caregiver states he / she is not on anticoagulants. Home medication list             

is obtained from the patient.                                                                   

- Exposure Risk Screening:: None identified.                                                      

                                                                                                  

                                                                                                  

Vital Signs:                                                                                      

                                                                                             

15:42  / 77; Pulse 98; Resp 16; Temp 98.9; Pulse Ox 99% ; Weight 68.04 kg / 150 lbs;    elp 

      Height 5 ft. 1 in. (154.94 cm);                                                             

17:23  / 73; Pulse 92; Resp 18; Temp 98.0; Pulse Ox 98% ; Pain 8/10;                    jam1

15:42 Body Mass Index 28.34 (68.04 kg, 154.94 cm)                                             elp 

                                                                                                  

MDM:                                                                                              

16:46 morphine 5 mg IM once ordered.                                                          le  

16:49 Hand, Complete Ordered.                                                                 EDMS

17:48 Financial registration complete.                                                        [facility]

17:54 Ace Wrap ordered.                                                                       jc4 

20:13 [facility] Payment Agreement was scanned into [employer] and attached to record.               ks                                                                                             

13:39 T-Sheet-- Draft Copy was scanned into [employer] and attached to record.                   gb  

                                                                                                  

Administered Medications:                                                                         

                                                                                             

17:04 Drug: morphine 5 mg [morphine 10 mg/mL injection solution (0.5 mL)] Route: IM; Site:    ck1 

      right deltoid;                                                                              

                                                                                                  

                                                                                                  

Signatures:                                                                                       

Dispatcher [employer]                           EDMS                                                 

Susy Del Real, Reg                  Reg  gb                                                   

Melly AlmanzaRN                  RN   ck1                                                  

Chantal Huang, EVERETTP                     FNP  Tika Morrison RN                    RN   hs1                                                  

Toshia García RN                    RN   jc4                                                  

Cira Blake, Reg                 Reg  ks16                                                 

                                                                                                  

The chart was reviewed and I authenticate all verbal orders and agree with the evaluation and 
treatment provided.Attachments:

20:13 [facility] Payment Agreement                                                                [facility]

                                                                                             

13:39 T-Sheet-- Draft Copy                                                                    gb  

                                                                                                  

**************************************************************************************************



*** Chart Complete ***
MTDD

## 2017-02-10 NOTE — EDDOCDS
Physician Documentation                                                                           

[facility]                                                                         

Name: Og Segal                                                                                  

Age: 31 yrs                                                                                       

Sex: Male                                                                                         

: 1985                                                                                   

MRN: [id number]                                                                                     

Arrival Date: 2017                                                                          

Time: 23:47                                                                                       

Account#: [id number]                                                                              

Bed D1                                                                                            

Private MD: Luciano Veronica                                                                          

Disposition:                                                                                      

17 01:43 Discharged to Home/Self Care. Impression: Contusion of left hand.                  

- Condition is Stable.                                                                            

- Discharge Instructions: Hand Contusion.                                                         

                                                                                                  

- Medication Reconciliation, Local Pharmacy Hours, Work Release Form - 2 day form.                

- Follow up: [facility]; When: 1 - 2 days; Reason: Recheck today's           

complaints, Continuance of care.                                                                

- Problem is new.                                                                                 

- Symptoms have improved.                                                                         

- Notes: USE THE PAIN MEDICATION THAT WAS PRESCRIBED TO YOU EARLIER AS INSTRUCTED, PICK           

UP WITH SCRIPT AT THE PHARMACY TOMORROW, USE THE ACE WRAP THAT WAS GIVEN TO YOU, USE            

ICE TO THE AREA, FOLLOW UP WITH [facility]                                       

                                                                                                  

                                                                                                  

Historical:                                                                                       

- Allergies: Toradol (Vomit);                                                                     

- Home Meds:                                                                                      

1. clonazepam 0.5 mg Oral tab 1 tab 2 times per day                                             

     (Last dose: 2017 09:00)                                                                

2. Prozac 40 mg Oral cap 1 cap once daily                                                       

     (Last dose: 2017 07:00)                                                                

3. Tylenol 325 mg Oral tab 1 tab every 4 hours                                                  

     (Last dose: Unknown)                                                                         

4. oxycodone 5 mg Oral tab 1 tab every 4 hours unable to  from the pharmacy              

- PMHx: Anxiety; Cleft Palate; collapsed lung; Depression; Seizure Disorder;                      

- PSHx: Chest Tube- Left; cleft palate repair;                                                    

- Social history: Smoking status: Patient uses tobacco products, current every day                

smoker. No barriers to communication noted, The patient speaks fluent English, Speaks           

appropriately for age.                                                                          

- Family history: Not pertinent.                                                                  

- : The pt / caregiver states he / she is not on anticoagulants. Home medication list             

is obtained from the patient.                                                                   

- Exposure Risk Screening:: None identified.                                                      

                                                                                                  

                                                                                                  

Vital Signs:                                                                                      

                                                                                             

23:49  / 78; Pulse 99; Resp 18 S; Temp 98.3(O); Pulse Ox 97% on R/A; Weight 68.95 kg /  gr2 

      152.01 lbs (R); Height 5 ft. 2 in. (157.48 cm) (R); Pain 8/10;                              

23:49 Body Mass Index 27.80 (68.95 kg, 157.48 cm)                                             gr2 

                                                                                                  

MDM:                                                                                              

                                                                                             

01:18 [facility] Payment Agreement was scanned into [employer] and attached to record.               hs2 

01:37 Financial registration complete.                                                        hs2 

02:44 oxyCODONE 5 mg PO once; DISPENSE TO GO HOME WITH ordered.                               ck7 

13:39 T-Sheet-- Draft Copy was scanned into [employer] and attached to record.                   gb  

                                                                                                  

Administered Medications:                                                                         

01:50 Drug: oxyCODONE 5 mg [oxycodone 5 mg tablet (1 tabs)] Route: PO;                        mlc 

02:45 Follow up: Response: Med's dispensed home                                               mlc 

02:44 CANCELLED (Other Intervention Used): oxyCODONE 5 mg PO once                             ck7 

                                                                                                  

                                                                                                  

Signatures:                                                                                       

Susy Del Real, Reg                  Reg  gb                                                   

Senia Sibley RN RN   dsf                                                  

Garrett Carter, RPA-C            RPA-Cck7                                                  

Roya Denny RN                          RN   [facility]                                                  

Myrtle Trejo, Reg                   Reg  hs2                                                  

                                                                                                  

The chart was reviewed and I authenticate all verbal orders and agree with the evaluation and 
treatment provided.Corrections: (The following items were deleted from the chart)

02:44 01:42 oxyCODONE 5 mg PO once ordered. 7                                                

02:44 02:44 oxyCODONE 5 mg PO once ordered. 7                                               ck7 

                                                                                                  

Attachments:                                                                                      

01:18 [facility] Payment Agreement                                                                hs2 

13:39 T-Sheet-- Draft Copy                                                                    gb  

                                                                                                  

**************************************************************************************************



*** Chart Complete ***
MTDD

## 2017-02-10 NOTE — EDDOCDS
Physician Documentation                                                                           

[facility]                                                                         

Name: Og Segal                                                                                  

Age: 31 yrs                                                                                       

Sex: Male                                                                                         

: 1985                                                                                   

MRN: [id number]                                                                                     

Arrival Date: 2017                                                                          

Time: 23:47                                                                                       

Account#: [id number]                                                                              

Bed D1                                                                                            

Private MD: Luciano Veronica                                                                          

Disposition:                                                                                      

17 01:43 Discharged to Home/Self Care. Impression: Contusion of left hand.                  

- Condition is Stable.                                                                            

- Discharge Instructions: Hand Contusion.                                                         

                                                                                                  

- Medication Reconciliation, Local Pharmacy Hours, Work Release Form - 2 day form.                

- Follow up: [facility]; When: 1 - 2 days; Reason: Recheck today's           

complaints, Continuance of care.                                                                

- Problem is new.                                                                                 

- Symptoms have improved.                                                                         

- Notes: USE THE PAIN MEDICATION THAT WAS PRESCRIBED TO YOU EARLIER AS INSTRUCTED, PICK           

UP WITH SCRIPT AT THE PHARMACY TOMORROW, USE THE ACE WRAP THAT WAS GIVEN TO YOU, USE            

ICE TO THE AREA, FOLLOW UP WITH [facility]                                       

                                                                                                  

                                                                                                  

Historical:                                                                                       

- Allergies: Toradol (Vomit);                                                                     

- Home Meds:                                                                                      

1. clonazepam 0.5 mg Oral tab 1 tab 2 times per day                                             

     (Last dose: 2017 09:00)                                                                

2. Prozac 40 mg Oral cap 1 cap once daily                                                       

     (Last dose: 2017 07:00)                                                                

3. Tylenol 325 mg Oral tab 1 tab every 4 hours                                                  

     (Last dose: Unknown)                                                                         

4. oxycodone 5 mg Oral tab 1 tab every 4 hours unable to  from the pharmacy              

- PMHx: Anxiety; Cleft Palate; collapsed lung; Depression; Seizure Disorder;                      

- PSHx: Chest Tube- Left; cleft palate repair;                                                    

- Social history: Smoking status: Patient uses tobacco products, current every day                

smoker. No barriers to communication noted, The patient speaks fluent English, Speaks           

appropriately for age.                                                                          

- Family history: Not pertinent.                                                                  

- : The pt / caregiver states he / she is not on anticoagulants. Home medication list             

is obtained from the patient.                                                                   

- Exposure Risk Screening:: None identified.                                                      

                                                                                                  

                                                                                                  

Vital Signs:                                                                                      

                                                                                             

23:49  / 78; Pulse 99; Resp 18 S; Temp 98.3(O); Pulse Ox 97% on R/A; Weight 68.95 kg /  gr2 

      152.01 lbs (R); Height 5 ft. 2 in. (157.48 cm) (R); Pain 8/10;                              

23:49 Body Mass Index 27.80 (68.95 kg, 157.48 cm)                                             gr2 

                                                                                                  

MDM:                                                                                              

                                                                                             

01:18 [facility] Payment Agreement was scanned into [employer] and attached to record.               hs2 

01:37 Financial registration complete.                                                        hs2 

02:44 oxyCODONE 5 mg PO once; DISPENSE TO GO HOME WITH ordered.                               ck7 

13:39 T-Sheet-- Draft Copy was scanned into [employer] and attached to record.                   gb  

                                                                                                  

Administered Medications:                                                                         

01:50 Drug: oxyCODONE 5 mg [oxycodone 5 mg tablet (1 tabs)] Route: PO;                        mlc 

02:45 Follow up: Response: Med's dispensed home                                               mlc 

02:44 CANCELLED (Other Intervention Used): oxyCODONE 5 mg PO once                             ck7 

                                                                                                  

                                                                                                  

Signatures:                                                                                       

Susy Del Real, Reg                  Reg  gb                                                   

Senia Sibley RN RN   dsf                                                  

Garrett Carter, RPA-C            RPA-Cck7                                                  

Roya Denny RN                          RN   [facility]                                                  

Myrtle Trejo, Reg                   Reg  hs2                                                  

                                                                                                  

The chart was reviewed and I authenticate all verbal orders and agree with the evaluation and 
treatment provided.Corrections: (The following items were deleted from the chart)

02:44 01:42 oxyCODONE 5 mg PO once ordered. 7                                                

02:44 02:44 oxyCODONE 5 mg PO once ordered. 7                                               ck7 

                                                                                                  

Attachments:                                                                                      

01:18 [facility] Payment Agreement                                                                hs2 

13:39 T-Sheet-- Draft Copy                                                                    gb  

                                                                                                  

**************************************************************************************************



*** Chart Complete ***
MTDD

## 2017-02-10 NOTE — EDDOCDS
Nurse's Notes                                                                                     

[facility]                                                                         

Name: Og Segal                                                                                  

Age: 31 yrs                                                                                       

Sex: Male                                                                                         

: 1985                                                                                   

MRN: [id number]                                                                                     

Arrival Date: 2017                                                                          

Time: 23:47                                                                                       

Account#: [id number]                                                                              

Bed D1                                                                                            

Private MD: Luciano Veronica                                                                          

Diagnosis: Contusion of left hand                                                                 

                                                                                                  

Presentation:                                                                                     

                                                                                             

23:56 Presenting complaint: Patient states: seen here earlier and diagnosed with chipped      dsf 

      fractures. Since discharge pt states the swelling and pain has gotten worse. pt did not     

       pain medication from pharmacy today because he had no ride. Adult Sepsis            

      Screening: The patient does not have new or worsening altered mentation. Patient's          

      respiratory rate is less than 22. Systolic blood pressure is greater than 100. Patient      

      has a qSOFA score of 0- Negative Sepsis Screen. Suicide/Homicide risk assessment- the       

      patient denies having any suicidal and/or homicidal ideations and does not present with     

      any other emotional, behavioral or mental health complaints.  Status: Patient       

      is not a  or  dependent. Transition of care: patient was not          

      received from another setting of care.                                                      

23:56 Acuity: DAIN Level 4                                                                     dsf 

23:56 Method Of Arrival: Walkin/Carried/Asstd                                                 dsf 

                                                                                                  

Triage Assessment:                                                                                

23:59 General: Appears in no apparent distress, Behavior is appropriate for age, cooperative. dsf 

      Pain: Location: left hand Pain currently is 9 out of 10 on a pain scale. Quality of         

      pain is described as unbearable. HIV screening NA for this visit Offered previously.        

      Musculoskeletal: redness and swelling to left hand.                                         

                                                                                                  

Historical:                                                                                       

- Allergies: Toradol (Vomit);                                                                     

- Home Meds:                                                                                      

1. clonazepam 0.5 mg Oral tab 1 tab 2 times per day                                             

     (Last dose: 2017 09:00)                                                                

2. Prozac 40 mg Oral cap 1 cap once daily                                                       

     (Last dose: 2017 07:00)                                                                

3. Tylenol 325 mg Oral tab 1 tab every 4 hours                                                  

     (Last dose: Unknown)                                                                         

4. oxycodone 5 mg Oral tab 1 tab every 4 hours unable to  from the pharmacy              

- PMHx: Anxiety; Cleft Palate; collapsed lung; Depression; Seizure Disorder;                      

- PSHx: Chest Tube- Left; cleft palate repair;                                                    

- Social history: Smoking status: Patient uses tobacco products, current every day                

smoker. No barriers to communication noted, The patient speaks fluent English, Speaks           

appropriately for age.                                                                          

- Family history: Not pertinent.                                                                  

- : The pt / caregiver states he / she is not on anticoagulants. Home medication list             

is obtained from the patient.                                                                   

- Exposure Risk Screening:: None identified.                                                      

                                                                                                  

                                                                                                  

Screenin                                                                                             

01:57 Screening information is obtained from the patient. Fall risk: No risks identified.     mlc 

      Assistance ADL's: requires no assistance with activities of daily living. Abuse/DV          

      Screen: The patient / caregiver reports he/she is: not in a situation that causes fear,     

      pain or injury. Nutritional screening: No deficits noted. Advance Directives:               

      Currently, there is no health care proxy. home support is adequate.                         

                                                                                                  

Assessment:                                                                                       

01:57 General: Appears in no apparent distress, comfortable, Behavior is cooperative. Pain:   mlc 

      Location: left hand. Neurological: Level of Consciousness is awake, alert, Oriented to      

      person, place, time. Respiratory: Airway is patent Respiratory effort is even,              

      unlabored, Respiratory pattern is regular. Derm: Skin is pink, warm & dry. Swollen area   

      noted on dorsum of left hand. Musculoskeletal: Circulation, motion, and sensation           

      intact Capillary refill < 3 seconds in left fingers.                                        

                                                                                                  

Vital Signs:                                                                                      

                                                                                             

23:49  / 78; Pulse 99; Resp 18 S; Temp 98.3(O); Pulse Ox 97% on R/A; Weight 68.95 kg    gr2 

      (R); Height 5 ft. 2 in. (157.48 cm) (R); Pain 8/10;                                         

23:49 Body Mass Index 27.80 (68.95 kg, 157.48 cm)                                             gr2 

                                                                                                  

Vitals:                                                                                           

23:49 Log In Time: 2017 at 23:49.                                                gr2 

                                                                                                  

ED Course:                                                                                        

23:49 Patient visited by Gogo Nance.                                                   gr2 

23:49 Luciano Veronica is Private Physician.                                                      gr2 

23:49 Patient moved to Waiting                                                                gr2 

23:50 Patient visited by Gogo Nance.                                                   gr2 

23:50 Patient moved to Pre RCE                                                                gr2 

23:57 Triage Initiated                                                                        dsf 

02                                                                                             

00:17 Patient moved to MTA Wait                                                               dsf 

01:18 [facility] Payment Agreement was scanned into [employer] and attached to record.               hs2 

01:21 Patient moved to I5 / M5                                                                ajs 

01:35 Garrett Carter RPA-C is PHCP.                                                   ck7 

01:35 Mario Houston MD is Attending Physician.                                               ck7 

01:35 Patient visited by Garrett Carter RPA-C.                                        ck7 

01:43 [facility] is Referral Physician.                                  ck7 

01:57 The patient / caregiver is instructed regarding the plan of care and ED course.         mlc 

01:57 No IV's were initiated during this patient's visit. No procedures done that require     mlc 

      assistance.                                                                                 

02:21 Patient moved to D1                                                                     mlc 

13:39 T-Sheet-- Draft Copy was scanned into [employer] and attached to record.                   gb  

                                                                                                  

Administered Medications:                                                                         

01:50 Drug: oxyCODONE 5 mg [oxycodone 5 mg tablet (1 tabs)] Route: PO;                        mlc 

02:45 Follow up: Response: Med's dispensed home                                               mlc 

02:44 CANCELLED (Other Intervention Used): oxyCODONE 5 mg PO once                             ck7 

                                                                                                  

                                                                                                  

Order Results:                                                                                    

There are currently no results for this order.                                                    

Outcome:                                                                                          

01:43 Discharge ordered by Provider.                                                          ck7 

01:57 Discharge Assessment: Patient awake, alert and oriented x 3. No cognitive and/or        mlc 

      functional deficits noted. Patient verbalized understanding of disposition                  

      instructions. patient administered narcotics - yes. Pt provided with safe discharge.        

      The following High Risk Discharge criteria are identified: None. Discharged to home         

      ambulatory. Condition: stable. Discharge instructions given to patient, Instructed on       

      discharge instructions, follow up and referral plans. Demonstrated understanding of         

      instructions, Pt was receptive of discharge instructions/ teaching. No special              

      radiology studies were completed. Property sent home with patient.                          

02:45 Patient left the ED.                                                                    [facility] 

                                                                                                  

Signatures:                                                                                       

Susy Del Real, Reg                  Reg  gb                                                   

Senia SibleyRN                       RN   dsf                                                  

Cathleen Cunningham Christopher, RPA-C            RPA-Cck7                                                  

Gogo Nance                            gr2                                                  

Roya Denny RN                          RN   [facility]                                                  

Myrtle Trejo, Reg                   Reg  hs2                                                  

                                                                                                  

Corrections: (The following items were deleted from the chart)                                    

00:11 0207 23:56 Presenting complaint: Patient states: seen here earlier and diagnosed with  dsf 

      chipped fractures. Since discharge pt states the swelling and pain has gotten worse dsf     

                                                                                                  

**************************************************************************************************



*** Chart Complete ***
MTDD

## 2017-02-11 ENCOUNTER — HOSPITAL ENCOUNTER (EMERGENCY)
Dept: HOSPITAL 53 - M ED | Age: 32
Discharge: HOME | End: 2017-02-11
Payer: COMMERCIAL

## 2017-02-11 DIAGNOSIS — F32.9: ICD-10-CM

## 2017-02-11 DIAGNOSIS — G40.909: ICD-10-CM

## 2017-02-11 DIAGNOSIS — Z79.899: ICD-10-CM

## 2017-02-11 DIAGNOSIS — S60.222A: Primary | ICD-10-CM

## 2017-02-11 DIAGNOSIS — F41.9: ICD-10-CM
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
## 2017-02-11 DIAGNOSIS — F17.210: ICD-10-CM

## 2017-02-11 DIAGNOSIS — Z87.09: ICD-10-CM
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
## 2017-02-11 DIAGNOSIS — Z88.8: ICD-10-CM

## 2017-02-11 NOTE — EDDOCDS
Physician Documentation                                                                           

[facility]                                                                         

Name: Og Segal                                                                                  

Age: 31 yrs                                                                                       

Sex: Male                                                                                         

: 1985                                                                                   

MRN: [id number]                                                                                     

Arrival Date: 2017                                                                          

Time: 16:41                                                                                       

Account#: [id number]                                                                              

Bed TR8                                                                                           

Private MD: [facility] [place]                                                                     

Disposition:                                                                                      

17 17:47 Discharged to Home/Self Care. Impression: Contusion of left hand.                  

- Condition is Stable.                                                                            

- Discharge Instructions: Elastic Bandage and RICE, Hand Contusion.                               

                                                                                                  

- Medication Reconciliation, Local Pharmacy Hours, Work Release Form - 3 day form.                

- Follow up: [facility] [place]; When: Call to arrange an appointment; Reason: Recheck             

today's complaints, Continuance of care.                                                        

- Problem is new.                                                                                 

- Symptoms are unchanged.                                                                         

                                                                                                  

                                                                                                  

                                                                                                  

Historical:                                                                                       

- Allergies: Toradol (Vomit);                                                                     

- Home Meds:                                                                                      

1. clonazepam 0.5 mg Oral tab 1 tab 2 times per day                                             

2. oxycodone 5 mg Oral tab 1 tab every 4 hours unable to  from the pharmacy              

3. Prozac 40 mg Oral cap 1 cap once daily                                                       

4. Tylenol 325 mg Oral tab 1 tab every 4 hours                                                  

5. gabapentin 100 mg Oral cap 2 caps daily                                                      

- PMHx: Anxiety; Cleft Palate; collapsed lung; Depression; Seizure Disorder;                      

- PSHx: Chest Tube- Left; cleft palate repair;                                                    

- Social history: Smoking status: Patient uses tobacco products, heavy tobacco smoker.            

No barriers to communication noted, The patient speaks fluent English, Speaks                   

appropriately for age.                                                                          

- Family history: Not pertinent.                                                                  

- : The pt / caregiver states he / she is not on anticoagulants. Home medication list             

is obtained from the patient.                                                                   

- Exposure Risk Screening:: None identified.                                                      

                                                                                                  

                                                                                                  

Vital Signs:                                                                                      

                                                                                             

16:43  / 61; Pulse 93; Resp 18; Temp 98.8(O); Pulse Ox 100% on R/A; Weight 68.04 kg /   dem1

      150 lbs; Height 5 ft. 1 in. (154.94 cm); Pain 9/10;                                         

16:43 Body Mass Index 28.34 (68.04 kg, 154.94 cm)                                             dem1

                                                                                                  

MDM:                                                                                              

17:46 HYDROcodone-acetaminophen 4 pack- 5 mg-325 mg 1 packets PO Per package directions;      mo1 

      Dispense with patient. 1 po q4h prn for pain ordered.                                       

17:46 Splint Affected Extremity ordered.                                                      mo1 

                                                                                                  

Administered Medications:                                                                         

17:51 Drug: HYDROcodone-acetaminophen 4 pack- 1 packets [hydrocodone 5 mg-acetaminophen 325   ck1 

      mg tablet (1 tabs)] {Co-Signature: rs3 (Iwona Loza RN).} Route: PO;                 

                                                                                                  

                                                                                                  

Signatures:                                                                                       

Gurwinder Quinonez RN                   RN   mlb1                                                 

Melly Almanza RN                  RN   ck1                                                  

Gurwinder Quintanilla PA                    PA   mo1                                                  

Iwona Loza RN                        rs3                                                  

                                                                                                  

**************************************************************************************************

MTDD

## 2017-02-11 NOTE — EDDOCDS
Nurse's Notes                                                                                     

[facility]                                                                         

Name: Og Segal                                                                                  

Age: 31 yrs                                                                                       

Sex: Male                                                                                         

: 1985                                                                                   

MRN: [id number]                                                                                     

Arrival Date: 2017                                                                          

Time: 16:41                                                                                       

Account#: [id number]                                                                              

Bed TR8                                                                                           

Private MD: [facility] [place]                                                                     

Diagnosis: Contusion of left hand                                                                 

                                                                                                  

Presentation:                                                                                     

                                                                                             

16:47 Presenting complaint: Patient states: Left hand injury on 17 here for recheck       mlb1

      states pain getting worse waking up at night. Adult Sepsis Screening: The patient does      

      not have new or worsening altered mentation. Patient's respiratory rate is less than        

      22. Systolic blood pressure is greater than 100. Patient has a qSOFA score of 0-            

      Negative Sepsis Screen. Suicide/Homicide risk assessment- the patient denies having any     

      suicidal and/or homicidal ideations and does not present with any other emotional,          

      behavioral or mental health complaints.  Status: Patient is not a service           

      member or  dependent. Transition of care: patient was not received from another     

      setting of care.                                                                            

16:47 Acuity: DAIN Level 4                                                                     mlb1

16:47 Method Of Arrival: Walkin/Carried/Asstd                                                 mlb1

                                                                                                  

Triage Assessment:                                                                                

16:50 General: Appears in no apparent distress, Behavior is appropriate for age, cooperative. mlb1

      Pain: Location: left hand Pain currently is 9 out of 10 on a pain scale. HIV screening      

      NA for this visit Offered previously.                                                       

                                                                                                  

Historical:                                                                                       

- Allergies: Toradol (Vomit);                                                                     

- Home Meds:                                                                                      

1. clonazepam 0.5 mg Oral tab 1 tab 2 times per day                                             

2. oxycodone 5 mg Oral tab 1 tab every 4 hours unable to  from the pharmacy              

3. Prozac 40 mg Oral cap 1 cap once daily                                                       

4. Tylenol 325 mg Oral tab 1 tab every 4 hours                                                  

5. gabapentin 100 mg Oral cap 2 caps daily                                                      

- PMHx: Anxiety; Cleft Palate; collapsed lung; Depression; Seizure Disorder;                      

- PSHx: Chest Tube- Left; cleft palate repair;                                                    

- Social history: Smoking status: Patient uses tobacco products, heavy tobacco smoker.            

No barriers to communication noted, The patient speaks fluent English, Speaks                   

appropriately for age.                                                                          

- Family history: Not pertinent.                                                                  

- : The pt / caregiver states he / she is not on anticoagulants. Home medication list             

is obtained from the patient.                                                                   

- Exposure Risk Screening:: None identified.                                                      

                                                                                                  

                                                                                                  

Screenin:52 Screening information is obtained from the patient. Fall risk: No risks identified.     ck1 

      Assistance ADL's: requires no assistance with activities of daily living. Abuse/DV          

      Screen: The patient / caregiver reports he/she is: not in a situation that causes fear,     

      pain or injury. Nutritional screening: No deficits noted. Advance Directives:               

      Currently, there is no health care proxy. home support is adequate.                         

                                                                                                  

Assessment:                                                                                       

17:52 General: Appears in no apparent distress, comfortable, Behavior is appropriate for age, ck1 

      cooperative. Pain: Location: left hand. Neurological: Level of Consciousness is awake,      

      alert, obeys commands, Oriented to person, place, time. Respiratory: Respiratory effort     

      is unlabored, Respiratory pattern is regular, symmetrical. Derm: Skin is pink, warm &     

      dry.                                                                                        

                                                                                                  

Vital Signs:                                                                                      

16:43  / 61; Pulse 93; Resp 18; Temp 98.8(O); Pulse Ox 100% on R/A; Weight 68.04 kg;    dem1

      Height 5 ft. 1 in. (154.94 cm); Pain 9/10;                                                  

16:43 Body Mass Index 28.34 (68.04 kg, 154.94 cm)                                             [facility]

                                                                                                  

Vitals:                                                                                           

16:43 Log In Time: 2017 at 16:40.                                                [facility]

                                                                                                  

ED Course:                                                                                        

16:43 Patient visited by Debbi Jeff.                                                     dem1

16:43 Other - Complete Info On Cds is Private Physician.                                      dem1

16:43 Patient moved to Waiting                                                                dem1

16:44 Patient moved to Pre RCE                                                                dem1

16:45 [facility] is Private Physician.                                                 dem1

16:47 Patient visited by Gurwinder Quinonez RN.                                               mlb1

16:48 Triage Initiated                                                                        mlb1

16:50 Patient visited by Gurwinder Quinonez RN.                                               mlb1

17:21 Patient moved to Triage 3                                                               jp4 

17:25 Gurwinder Quintanilla PA is PHCP.                                                           mo1 

17:25 Lundborg-Gray, Maja, MD is Attending Physician.                                         mo1 

17:33 Patient visited by Melly Almanza RN.                                              ck1 

17:40 Patient visited by Gurwinder Quintanilla PA.                                                mo1 

17:47 [facility] is Referral Physician.                                                mo1 

17:52 Patient moved to TR8                                                                    rs3 

17:52 The patient / caregiver is instructed regarding the plan of care and ED course.         ck1 

17:52 No IV's were initiated during this patient's visit. No procedures done that require     ck1 

      assistance.                                                                                 

                                                                                                  

Administered Medications:                                                                         

17:51 Drug: HYDROcodone-acetaminophen 4 pack- 1 packets [hydrocodone 5 mg-acetaminophen 325   ck1 

      mg tablet (1 tabs)] {Co-Signature: rs3 (Iwona Loza RN).} Route: PO;                 

                                                                                                  

                                                                                                  

Order Results:                                                                                    

There are currently no results for this order.                                                    

Outcome:                                                                                          

17:47 Discharge ordered by Provider.                                                          mo1 

17:51 Discharge Assessment: Patient awake, alert and oriented x 3. No cognitive and/or        ck1 

      functional deficits noted. Patient verbalized understanding of disposition                  

      instructions. patient administered narcotics - yes. Pt provided with safe discharge.        

      The following High Risk Discharge criteria are identified: None. Discharged to home         

      ambulatory, with significant other. Condition: stable. Discharge instructions given to      

      patient, Instructed on discharge instructions, follow up and referral plans. medication     

      usage, Demonstrated understanding of instructions, medications, Pt was receptive of         

      discharge instructions/ teaching. Work note provided to patient. No special radiology       

      studies were completed. Property :Personal belongings accompany Pt.                         

17:53 Patient left the ED.                                                                    ck1 

                                                                                                  

Signatures:                                                                                       

Gurwinedr Quinonez, RN                   RN   mlb1                                                 

Melly Almanza RN                  RN   ck1                                                  

Iwona Loza,RN                   RN   rs3                                                  

Debbi Jeff1                                                 

Gurwinder Quintanilla PA                    PA   mo1                                                  

Joe Holm                               jp4                                                  

Iwona Loza RN                        rs3                                                  

                                                                                                  

**************************************************************************************************

MTDD

## 2017-02-13 NOTE — EDDOCDS
Physician Documentation                                                                           

[facility]                                                                         

Name: Og Segal                                                                                  

Age: 31 yrs                                                                                       

Sex: Male                                                                                         

: 1985                                                                                   

MRN: [id number]                                                                                     

Arrival Date: 2017                                                                          

Time: 16:41                                                                                       

Account#: [id number]                                                                              

Bed TR8                                                                                           

Private MD: [facility] [place]                                                                     

Disposition:                                                                                      

17 17:47 Discharged to Home/Self Care. Impression: Contusion of left hand.                  

- Condition is Stable.                                                                            

- Discharge Instructions: Elastic Bandage and RICE, Hand Contusion.                               

                                                                                                  

- Medication Reconciliation, Local Pharmacy Hours, Work Release Form - 3 day form.                

- Follow up: [facility] [place]; When: Call to arrange an appointment; Reason: Recheck             

today's complaints, Continuance of care.                                                        

- Problem is new.                                                                                 

- Symptoms are unchanged.                                                                         

                                                                                                  

                                                                                                  

                                                                                                  

Historical:                                                                                       

- Allergies: Toradol (Vomit);                                                                     

- Home Meds:                                                                                      

1. clonazepam 0.5 mg Oral tab 1 tab 2 times per day                                             

2. oxycodone 5 mg Oral tab 1 tab every 4 hours unable to  from the pharmacy              

3. Prozac 40 mg Oral cap 1 cap once daily                                                       

4. Tylenol 325 mg Oral tab 1 tab every 4 hours                                                  

5. gabapentin 100 mg Oral cap 2 caps daily                                                      

- PMHx: Anxiety; Cleft Palate; collapsed lung; Depression; Seizure Disorder;                      

- PSHx: Chest Tube- Left; cleft palate repair;                                                    

- Social history: Smoking status: Patient uses tobacco products, heavy tobacco smoker.            

No barriers to communication noted, The patient speaks fluent English, Speaks                   

appropriately for age.                                                                          

- Family history: Not pertinent.                                                                  

- : The pt / caregiver states he / she is not on anticoagulants. Home medication list             

is obtained from the patient.                                                                   

- Exposure Risk Screening:: None identified.                                                      

                                                                                                  

                                                                                                  

Vital Signs:                                                                                      

                                                                                             

16:43  / 61; Pulse 93; Resp 18; Temp 98.8(O); Pulse Ox 100% on R/A; Weight 68.04 kg /   dem1

      150 lbs; Height 5 ft. 1 in. (154.94 cm); Pain 9/10;                                         

16:43 Body Mass Index 28.34 (68.04 kg, 154.94 cm)                                             dem1

                                                                                                  

MDM:                                                                                              

17:46 HYDROcodone-acetaminophen 4 pack- 5 mg-325 mg 1 packets PO Per package directions;      mo1 

      Dispense with patient. 1 po q4h prn for pain ordered.                                       

17:46 Splint Affected Extremity ordered.                                                      mo1 

21:59 T-Sheet-- Draft Copy was scanned into [employer] and attached to record.                   klr 

                                                                                                  

Administered Medications:                                                                         

17:51 Drug: HYDROcodone-acetaminophen 4 pack- 1 packets [hydrocodone 5 mg-acetaminophen 325   ck1 

      mg tablet (1 tabs)] {Co-Signature: rs3 (Iwona Loza RN).} Route: PO;                 

                                                                                                  

                                                                                                  

Signatures:                                                                                       

Gurwinder Quinonez RN                   RN   mlb1                                                 

Melly Almanza RN                  RN   ck1                                                  

Gurwinder Quintanilla PA PA   mo1                                                  

Hui Kapadia                               klr                                                  

Iwona Loza RN                        rs3                                                  

                                                                                                  

The chart was reviewed and I authenticate all verbal orders and agree with the evaluation and 
treatment provided.Attachments:

21:59 T-Sheet-- Draft Copy                                                                    klr 

                                                                                                  

**************************************************************************************************



*** Chart Complete ***
MTDD

## 2017-02-13 NOTE — EDDOCDS
Physician Documentation                                                                           

[facility]                                                                         

Name: Og Segal                                                                                  

Age: 31 yrs                                                                                       

Sex: Male                                                                                         

: 1985                                                                                   

MRN: [id number]                                                                                     

Arrival Date: 2017                                                                          

Time: 16:41                                                                                       

Account#: [id number]                                                                              

Bed TR8                                                                                           

Private MD: [facility] [place]                                                                     

Disposition:                                                                                      

17 17:47 Discharged to Home/Self Care. Impression: Contusion of left hand.                  

- Condition is Stable.                                                                            

- Discharge Instructions: Elastic Bandage and RICE, Hand Contusion.                               

                                                                                                  

- Medication Reconciliation, Local Pharmacy Hours, Work Release Form - 3 day form.                

- Follow up: [facility] [place]; When: Call to arrange an appointment; Reason: Recheck             

today's complaints, Continuance of care.                                                        

- Problem is new.                                                                                 

- Symptoms are unchanged.                                                                         

                                                                                                  

                                                                                                  

                                                                                                  

Historical:                                                                                       

- Allergies: Toradol (Vomit);                                                                     

- Home Meds:                                                                                      

1. clonazepam 0.5 mg Oral tab 1 tab 2 times per day                                             

2. oxycodone 5 mg Oral tab 1 tab every 4 hours unable to  from the pharmacy              

3. Prozac 40 mg Oral cap 1 cap once daily                                                       

4. Tylenol 325 mg Oral tab 1 tab every 4 hours                                                  

5. gabapentin 100 mg Oral cap 2 caps daily                                                      

- PMHx: Anxiety; Cleft Palate; collapsed lung; Depression; Seizure Disorder;                      

- PSHx: Chest Tube- Left; cleft palate repair;                                                    

- Social history: Smoking status: Patient uses tobacco products, heavy tobacco smoker.            

No barriers to communication noted, The patient speaks fluent English, Speaks                   

appropriately for age.                                                                          

- Family history: Not pertinent.                                                                  

- : The pt / caregiver states he / she is not on anticoagulants. Home medication list             

is obtained from the patient.                                                                   

- Exposure Risk Screening:: None identified.                                                      

                                                                                                  

                                                                                                  

Vital Signs:                                                                                      

                                                                                             

16:43  / 61; Pulse 93; Resp 18; Temp 98.8(O); Pulse Ox 100% on R/A; Weight 68.04 kg /   dem1

      150 lbs; Height 5 ft. 1 in. (154.94 cm); Pain 9/10;                                         

16:43 Body Mass Index 28.34 (68.04 kg, 154.94 cm)                                             dem1

                                                                                                  

MDM:                                                                                              

17:46 HYDROcodone-acetaminophen 4 pack- 5 mg-325 mg 1 packets PO Per package directions;      mo1 

      Dispense with patient. 1 po q4h prn for pain ordered.                                       

17:46 Splint Affected Extremity ordered.                                                      mo1 

21:59 T-Sheet-- Draft Copy was scanned into [employer] and attached to record.                   klr 

                                                                                                  

Administered Medications:                                                                         

17:51 Drug: HYDROcodone-acetaminophen 4 pack- 1 packets [hydrocodone 5 mg-acetaminophen 325   ck1 

      mg tablet (1 tabs)] {Co-Signature: rs3 (Iwona Loza RN).} Route: PO;                 

                                                                                                  

                                                                                                  

Signatures:                                                                                       

Gurwinder Quinonez RN                   RN   mlb1                                                 

Mlely Almanza RN                  RN   ck1                                                  

Gurwinder Quintanilla PA PA   mo1                                                  

Hui Kapadia                               klr                                                  

Iwona Loza RN                        rs3                                                  

                                                                                                  

The chart was reviewed and I authenticate all verbal orders and agree with the evaluation and 
treatment provided.Attachments:

21:59 T-Sheet-- Draft Copy                                                                    klr 

                                                                                                  

**************************************************************************************************



*** Chart Complete ***
MTDD

## 2017-02-13 NOTE — EDDOCDS
Nurse's Notes                                                                                     

[facility]                                                                         

Name: Og Segal                                                                                  

Age: 31 yrs                                                                                       

Sex: Male                                                                                         

: 1985                                                                                   

MRN: [id number]                                                                                     

Arrival Date: 2017                                                                          

Time: 16:41                                                                                       

Account#: [id number]                                                                              

Bed TR8                                                                                           

Private MD: [facility] [place]                                                                     

Diagnosis: Contusion of left hand                                                                 

                                                                                                  

Presentation:                                                                                     

                                                                                             

16:47 Presenting complaint: Patient states: Left hand injury on 17 here for recheck       mlb1

      states pain getting worse waking up at night. Adult Sepsis Screening: The patient does      

      not have new or worsening altered mentation. Patient's respiratory rate is less than        

      22. Systolic blood pressure is greater than 100. Patient has a qSOFA score of 0-            

      Negative Sepsis Screen. Suicide/Homicide risk assessment- the patient denies having any     

      suicidal and/or homicidal ideations and does not present with any other emotional,          

      behavioral or mental health complaints.  Status: Patient is not a service           

      member or  dependent. Transition of care: patient was not received from another     

      setting of care.                                                                            

16:47 Acuity: DAIN Level 4                                                                     mlb1

16:47 Method Of Arrival: Walkin/Carried/Asstd                                                 mlb1

                                                                                                  

Triage Assessment:                                                                                

16:50 General: Appears in no apparent distress, Behavior is appropriate for age, cooperative. mlb1

      Pain: Location: left hand Pain currently is 9 out of 10 on a pain scale. HIV screening      

      NA for this visit Offered previously.                                                       

                                                                                                  

Historical:                                                                                       

- Allergies: Toradol (Vomit);                                                                     

- Home Meds:                                                                                      

1. clonazepam 0.5 mg Oral tab 1 tab 2 times per day                                             

2. oxycodone 5 mg Oral tab 1 tab every 4 hours unable to  from the pharmacy              

3. Prozac 40 mg Oral cap 1 cap once daily                                                       

4. Tylenol 325 mg Oral tab 1 tab every 4 hours                                                  

5. gabapentin 100 mg Oral cap 2 caps daily                                                      

- PMHx: Anxiety; Cleft Palate; collapsed lung; Depression; Seizure Disorder;                      

- PSHx: Chest Tube- Left; cleft palate repair;                                                    

- Social history: Smoking status: Patient uses tobacco products, heavy tobacco smoker.            

No barriers to communication noted, The patient speaks fluent English, Speaks                   

appropriately for age.                                                                          

- Family history: Not pertinent.                                                                  

- : The pt / caregiver states he / she is not on anticoagulants. Home medication list             

is obtained from the patient.                                                                   

- Exposure Risk Screening:: None identified.                                                      

                                                                                                  

                                                                                                  

Screenin:52 Screening information is obtained from the patient. Fall risk: No risks identified.     ck1 

      Assistance ADL's: requires no assistance with activities of daily living. Abuse/DV          

      Screen: The patient / caregiver reports he/she is: not in a situation that causes fear,     

      pain or injury. Nutritional screening: No deficits noted. Advance Directives:               

      Currently, there is no health care proxy. home support is adequate.                         

                                                                                                  

Assessment:                                                                                       

17:52 General: Appears in no apparent distress, comfortable, Behavior is appropriate for age, ck1 

      cooperative. Pain: Location: left hand. Neurological: Level of Consciousness is awake,      

      alert, obeys commands, Oriented to person, place, time. Respiratory: Respiratory effort     

      is unlabored, Respiratory pattern is regular, symmetrical. Derm: Skin is pink, warm &     

      dry.                                                                                        

                                                                                                  

Vital Signs:                                                                                      

16:43  / 61; Pulse 93; Resp 18; Temp 98.8(O); Pulse Ox 100% on R/A; Weight 68.04 kg;    dem1

      Height 5 ft. 1 in. (154.94 cm); Pain 9/10;                                                  

16:43 Body Mass Index 28.34 (68.04 kg, 154.94 cm)                                             [facility]

                                                                                                  

Vitals:                                                                                           

16:43 Log In Time: 2017 at 16:40.                                                [facility]

                                                                                                  

ED Course:                                                                                        

16:43 Patient visited by Debbi Jeff.                                                     dem1

16:43 Other - Complete Info On Cds is Private Physician.                                      dem1

16:43 Patient moved to Waiting                                                                dem1

16:44 Patient moved to Pre RCE                                                                dem1

16:45 [facility] is Private Physician.                                                 dem1

16:47 Patient visited by Gurwinder Quinonez RN.                                               mlb1

16:48 Triage Initiated                                                                        mlb1

16:50 Patient visited by Gurwinder Quinonez RN.                                               mlb1

17:21 Patient moved to Triage 3                                                               jp4 

17:25 Gurwinder Quintanilla PA is PHCP.                                                           mo1 

17:25 Lundborg-Gray, Maja, MD is Attending Physician.                                         mo1 

17:33 Patient visited by Melly Almanza RN.                                              ck1 

17:40 Patient visited by Gurwinder Quintanilla PA.                                                mo1 

17:47 [facility] is Referral Physician.                                                mo1 

17:52 Patient moved to TR8                                                                    rs3 

17:52 The patient / caregiver is instructed regarding the plan of care and ED course.         ck1 

17:52 No IV's were initiated during this patient's visit. No procedures done that require     ck1 

      assistance.                                                                                 

21:59 T-Sheet-- Draft Copy was scanned into [employer] and attached to record.                   klr 

                                                                                                  

Administered Medications:                                                                         

17:51 Drug: HYDROcodone-acetaminophen 4 pack- 1 packets [hydrocodone 5 mg-acetaminophen 325   ck1 

      mg tablet (1 tabs)] {Co-Signature: rs3 (Iwona Loza RN).} Route: PO;                 

                                                                                                  

                                                                                                  

Order Results:                                                                                    

There are currently no results for this order.                                                    

Outcome:                                                                                          

17:47 Discharge ordered by Provider.                                                          mo1 

17:51 Discharge Assessment: Patient awake, alert and oriented x 3. No cognitive and/or        ck1 

      functional deficits noted. Patient verbalized understanding of disposition                  

      instructions. patient administered narcotics - yes. Pt provided with safe discharge.        

      The following High Risk Discharge criteria are identified: None. Discharged to home         

      ambulatory, with significant other. Condition: stable. Discharge instructions given to      

      patient, Instructed on discharge instructions, follow up and referral plans. medication     

      usage, Demonstrated understanding of instructions, medications, Pt was receptive of         

      discharge instructions/ teaching. Work note provided to patient. No special radiology       

      studies were completed. Property :Personal belongings accompany Pt.                         

17:53 Patient left the ED.                                                                    ck1 

                                                                                                  

Signatures:                                                                                       

Gurwinder Quinonez RN                   RN   mlb1                                                 

Melly Almanza RN                  RN   ck1                                                  

Iwona Loza,RN                   RN   rs3                                                  

Debbi Jeff Michael, PA                    PA   mo1                                                  

Joe Holm jp4                                                  

Hui Kapadiar                                                  

Iwona Loza RN                        rs3                                                  

                                                                                                  

**************************************************************************************************



*** Chart Complete ***
MTDD

## 2017-02-28 ENCOUNTER — HOSPITAL ENCOUNTER (EMERGENCY)
Dept: HOSPITAL 53 - M ED | Age: 32
Discharge: HOME | End: 2017-02-28
Payer: COMMERCIAL

## 2017-02-28 DIAGNOSIS — R07.9: ICD-10-CM

## 2017-02-28 DIAGNOSIS — Z87.09: ICD-10-CM

## 2017-02-28 DIAGNOSIS — G89.29: Primary | ICD-10-CM

## 2017-02-28 DIAGNOSIS — F41.9: ICD-10-CM

## 2017-02-28 DIAGNOSIS — G40.909: ICD-10-CM

## 2017-02-28 DIAGNOSIS — F32.9: ICD-10-CM

## 2017-02-28 DIAGNOSIS — Z87.730: ICD-10-CM

## 2017-02-28 DIAGNOSIS — Z79.899: ICD-10-CM

## 2017-02-28 DIAGNOSIS — F17.210: ICD-10-CM

## 2017-02-28 NOTE — EDDOCDS
Nurse's Notes                                                                                     

[facility]                                                                         

Name: Og Segal                                                                                  

Age: 31 yrs                                                                                       

Sex: Male                                                                                         

: 1985                                                                                   

MRN: [id number]                                                                                     

Arrival Date: 2017                                                                          

Time: 04:09                                                                                       

Account#: [id number]                                                                              

Bed 5                                                                                             

Private MD:                                                                                       

Diagnosis: Chronic post-thoracotomy pain                                                          

                                                                                                  

Presentation:                                                                                     

                                                                                             

04:14 Presenting complaint: Patient states: he helped someone move over the last couple days. júnior 

      Was in pain management program in [place] in the past. states tylenol upsets his           

      stomach but percocet does not. complaining of left sided pain at site of previous scar      

      from pneumothorax. Adult Sepsis Screening: The patient does not have new or worsening       

      altered mentation. Patient's respiratory rate is less than 22. Systolic blood pressure      

      is greater than 100. Patient has a qSOFA score of 0- Negative Sepsis Screen.                

      Suicide/Homicide risk assessment- the patient denies having any suicidal and/or             

      homicidal ideations and does not present with any other emotional, behavioral or mental     

      health complaints.  Status: Patient is not a  or              

      dependent. Transition of care: patient was not received from another setting of care.       

04:14 Acuity: DAIN Level 4                                                                      

04:14 Method Of Arrival: Ambulance                                                             

                                                                                                  

Triage Assessment:                                                                                

04:40 General: Appears in no apparent distress, comfortable, Behavior is appropriate for age, nn1 

      cooperative, Patient texting on phone throughout triage process. . Pain: Location:          

      anterior aspect of left lateral abdomen and posterior aspect of left lateral abdomen        

      Pain currently is 8 out of 10 on a pain scale. HIV screening NA for this visit Offered      

      previously. The patient is triaged at the bedside. See Assessment in Nurses Notes           

      section of ED record. Neurological: Level of Consciousness is awake, alert, obeys           

      commands, Oriented to person, place, time. Respiratory: Airway is patent Respiratory        

      effort is even, unlabored, Respiratory pattern is regular, symmetrical, Breath sounds       

      are clear bilaterally. Reports shortness of breath. GI: No deficits noted. Derm: Skin       

      is pink, warm & dry.                                                                      

                                                                                                  

Historical:                                                                                       

- Allergies: Toradol (Vomit);                                                                     

- Home Meds:                                                                                      

1. gabapentin 100 mg Oral cap 2 caps daily                                                      

     (Last dose: 2017 01:00)                                                                

2. clonazepam 0.5 mg Oral tab 1 tab 2 times per day                                             

     (Last dose: 2017 13:00)                                                                

3. Prozac 40 mg Oral cap 1 cap once daily                                                       

     (Last dose: 2017 08:00)                                                                

- PMHx: Anxiety; Cleft Palate; collapsed lung; Depression; Seizure Disorder;                      

- PSHx: pneumothorax; cleft palate;                                                               

- Social history: Smoking status: Patient uses tobacco products, heavy tobacco smoker.            

No barriers to communication noted, The patient speaks fluent English.                          

- Family history: No immediate family members are acutely ill.                                    

- : The pt / caregiver states he / she is not on anticoagulants. Home medication list             

is obtained from the patient, Unable to Verify Home Med List with the patient /                 

caregiver.                                                                                      

- Exposure Risk Screening:: None identified.                                                      

                                                                                                  

                                                                                                  

Screenin:04 Screening information is obtained from the patient. Fall risk: No risks identified.     nn1 

      Assistance ADL's: requires no assistance with activities of daily living. Abuse/DV          

      Screen: The patient / caregiver reports he/she is: not in a situation that causes fear,     

      pain or injury. Nutritional screening: No deficits noted. Advance Directives: There is      

      no active DNR order. home support is adequate.                                              

                                                                                                  

Assessment:                                                                                       

04:42 General: See triage assessment . Cardiovascular: Capillary refill < 3 seconds Chest     nn1 

      pain is denied. Respiratory: Airway is patent Respiratory effort is even, unlabored,        

      Respiratory pattern is regular, symmetrical, Breath sounds are clear bilaterally. Derm:     

      Skin is pink, warm & dry.                                                                 

05:08 General: Appears in no apparent distress, comfortable. Respiratory: Airway is patent    nn1 

      Respiratory effort is even, unlabored, Respiratory pattern is regular, symmetrical.         

      Derm: Skin is pink, warm & dry.                                                           

                                                                                                  

Vital Signs:                                                                                      

04:40  / 82; Pulse 104; Resp 18; Temp 98.7(TE); Pulse Ox 97% on R/A; Weight 68.04 kg;   nn1 

      Height 5 ft. 2 in. (157.48 cm); Pain 8/10;                                                  

04:40 Body Mass Index 27.44 (68.04 kg, 157.48 cm)                                             nn1 

                                                                                                  

Vitals:                                                                                           

04:40 Log In Time N/A - ambulance arrival.                                                    nn1 

                                                                                                  

ED Course:                                                                                        

04:10 Patient visited by Lopresti, Mary-Elizabeth, Unit Clerk.                                ml3 

04:10 Patient moved to Waiting                                                                ml3 

04:10 Patient moved to 5                                                                      ml3 

04:14 El Rodriguez DO is Attending Physician.                                               cs11

04:14 Patient visited by El Rodriguez DO.                                                   cs11

04:20 Triage Initiated                                                                         

04:50 Patient moved to Radiology                                                              [facility] 

04:58 Patient moved to [facility] 

05:01 Patient visited by Natacha Ortiz,ECHO.                                                    nn1 

05:04 No IV's were initiated during this patient's visit. No procedures done that require     nn1 

      assistance.                                                                                 

05:09 The patient / caregiver is instructed regarding the plan of care and ED course.         nn1 

                                                                                                  

Administered Medications:                                                                         

04:59 Drug: traMADol 50 mg [tramadol 50 mg tablet (1 tabs)] Route: PO;                        nn1 

                                                                                                  

                                                                                                  

Order Results:                                                                                    

There are currently no results for this order.                                                    

Outcome:                                                                                          

05:01 Discharge ordered by Provider.                                                          cs11

05:09 Discharge Assessment: Patient awake, alert and oriented x 3. No cognitive and/or        nn1 

      functional deficits noted. Patient verbalized understanding of disposition                  

      instructions. patient administered narcotics - yes. Pt provided with safe discharge.        

      The following High Risk Discharge criteria are identified: None. Discharged to home         

      ambulatory. Condition: stable Condition: improved. No special radiology studies were        

      completed. Property :Personal belongings accompany Pt.                                      

05:10 Patient left the ED.                                                                    nn1 

                                                                                                  

Signatures:                                                                                       

Hannah Laguerre, RN                    RN   jan Lopresti, Mary-Elizabeth, Unit Clerk    Unit ml3                                                  

El Rodriguez DO                       DO   cs11                                                 

Sundeep Layton                                [facility]                                                  

Natacha Ortiz,RN                        RN   nn1                                                  

                                                                                                  

**************************************************************************************************

MTDD

## 2017-02-28 NOTE — EDDOCDS
Physician Documentation                                                                           

[facility]                                                                         

Name: Og Segla                                                                                  

Age: 31 yrs                                                                                       

Sex: Male                                                                                         

: 1985                                                                                   

MRN: [id number]                                                                                     

Arrival Date: 2017                                                                          

Time: 04:09                                                                                       

Account#: [id number]                                                                              

Bed 5                                                                                             

Private MD:                                                                                       

Disposition:                                                                                      

17 05:01 Discharged to Home/Self Care. Impression: Chronic post-thoracotomy pain.           

- Condition is Stable.                                                                            

                                                                                                  

                                                                                                  

- Medication Reconciliation, Local Pharmacy Hours form.                                           

- Follow up: Private Physician; When: Call to arrange an appointment; Reason:                     

Continuance of care.                                                                            

- Problem is chronic.                                                                             

- Symptoms are unchanged.                                                                         

                                                                                                  

                                                                                                  

                                                                                                  

Historical:                                                                                       

- Allergies: Toradol (Vomit);                                                                     

- Home Meds:                                                                                      

1. gabapentin 100 mg Oral cap 2 caps daily                                                      

     (Last dose: 2017 01:00)                                                                

2. clonazepam 0.5 mg Oral tab 1 tab 2 times per day                                             

     (Last dose: 2017 13:00)                                                                

3. Prozac 40 mg Oral cap 1 cap once daily                                                       

     (Last dose: 2017 08:00)                                                                

- PMHx: Anxiety; Cleft Palate; collapsed lung; Depression; Seizure Disorder;                      

- PSHx: pneumothorax; cleft palate;                                                               

- Social history: Smoking status: Patient uses tobacco products, heavy tobacco smoker.            

No barriers to communication noted, The patient speaks fluent English.                          

- Family history: No immediate family members are acutely ill.                                    

- : The pt / caregiver states he / she is not on anticoagulants. Home medication list             

is obtained from the patient, Unable to Verify Home Med List with the patient /                 

caregiver.                                                                                      

- Exposure Risk Screening:: None identified.                                                      

                                                                                                  

                                                                                                  

Vital Signs:                                                                                      

                                                                                             

04:40  / 82; Pulse 104; Resp 18; Temp 98.7(TE); Pulse Ox 97% on R/A; Weight 68.04 kg /  nn1 

      150 lbs; Height 5 ft. 2 in. (157.48 cm); Pain 8/10;                                         

04:40 Body Mass Index 27.44 (68.04 kg, 157.48 cm)                                             nn1 

                                                                                                  

MDM:                                                                                              

04:49 traMADol 50 mg PO once ordered.                                                         cs11

04:49 Chest, 2 View (pa\E\lat) Ordered.                                                         EDMS

                                                                                                  

Administered Medications:                                                                         

04:59 Drug: traMADol 50 mg [tramadol 50 mg tablet (1 tabs)] Route: PO;                        nn1 

                                                                                                  

                                                                                                  

Signatures:                                                                                       

Dispatcher [employer]                           EDMS                                                 

Shade Hannah New, RN                    RN   El Koehler DO DO   cs11                                                 

Natacha Ortiz RN                        RN   nn1                                                  

                                                                                                  

**************************************************************************************************

LEONIDAS

## 2017-02-28 NOTE — REP
TWO VIEW CHEST:

 

Two views of the chest are performed.  No consolidating infiltrate is seen.

There is mild bibasilar discoid atelectasis.  The heart and mediastinum are

within normal limits and the visualized osseous structures appear intact.

 

IMPRESSION:

 

No acute infiltrate.

 

 

Signed by

Chace Nixon MD 02/28/2017 04:52 P

## 2017-03-02 NOTE — EDDOCDS
Nurse's Notes                                                                                     

[facility]                                                                         

Name: Og Segal                                                                                  

Age: 31 yrs                                                                                       

Sex: Male                                                                                         

: 1985                                                                                   

MRN: [id number]                                                                                     

Arrival Date: 2017                                                                          

Time: 04:09                                                                                       

Account#: [id number]                                                                              

Bed 5                                                                                             

Private MD:                                                                                       

Diagnosis: Chronic post-thoracotomy pain                                                          

                                                                                                  

Presentation:                                                                                     

                                                                                             

04:14 Presenting complaint: Patient states: he helped someone move over the last couple days. júnior 

      Was in pain management program in [place] in the past. states tylenol upsets his           

      stomach but percocet does not. complaining of left sided pain at site of previous scar      

      from pneumothorax. Adult Sepsis Screening: The patient does not have new or worsening       

      altered mentation. Patient's respiratory rate is less than 22. Systolic blood pressure      

      is greater than 100. Patient has a qSOFA score of 0- Negative Sepsis Screen.                

      Suicide/Homicide risk assessment- the patient denies having any suicidal and/or             

      homicidal ideations and does not present with any other emotional, behavioral or mental     

      health complaints.  Status: Patient is not a  or              

      dependent. Transition of care: patient was not received from another setting of care.       

04:14 Acuity: DAIN Level 4                                                                      

04:14 Method Of Arrival: Ambulance                                                             

                                                                                                  

Triage Assessment:                                                                                

04:40 General: Appears in no apparent distress, comfortable, Behavior is appropriate for age, nn1 

      cooperative, Patient texting on phone throughout triage process. . Pain: Location:          

      anterior aspect of left lateral abdomen and posterior aspect of left lateral abdomen        

      Pain currently is 8 out of 10 on a pain scale. HIV screening NA for this visit Offered      

      previously. The patient is triaged at the bedside. See Assessment in Nurses Notes           

      section of ED record. Neurological: Level of Consciousness is awake, alert, obeys           

      commands, Oriented to person, place, time. Respiratory: Airway is patent Respiratory        

      effort is even, unlabored, Respiratory pattern is regular, symmetrical, Breath sounds       

      are clear bilaterally. Reports shortness of breath. GI: No deficits noted. Derm: Skin       

      is pink, warm & dry.                                                                      

                                                                                                  

Historical:                                                                                       

- Allergies: Toradol (Vomit);                                                                     

- Home Meds:                                                                                      

1. gabapentin 100 mg Oral cap 2 caps daily                                                      

     (Last dose: 2017 01:00)                                                                

2. clonazepam 0.5 mg Oral tab 1 tab 2 times per day                                             

     (Last dose: 2017 13:00)                                                                

3. Prozac 40 mg Oral cap 1 cap once daily                                                       

     (Last dose: 2017 08:00)                                                                

- PMHx: Anxiety; Cleft Palate; collapsed lung; Depression; Seizure Disorder;                      

- PSHx: pneumothorax; cleft palate;                                                               

- Social history: Smoking status: Patient uses tobacco products, heavy tobacco smoker.            

No barriers to communication noted, The patient speaks fluent English.                          

- Family history: No immediate family members are acutely ill.                                    

- : The pt / caregiver states he / she is not on anticoagulants. Home medication list             

is obtained from the patient, Unable to Verify Home Med List with the patient /                 

caregiver.                                                                                      

- Exposure Risk Screening:: None identified.                                                      

                                                                                                  

                                                                                                  

Screenin:04 Screening information is obtained from the patient. Fall risk: No risks identified.     nn1 

      Assistance ADL's: requires no assistance with activities of daily living. Abuse/DV          

      Screen: The patient / caregiver reports he/she is: not in a situation that causes fear,     

      pain or injury. Nutritional screening: No deficits noted. Advance Directives: There is      

      no active DNR order. home support is adequate.                                              

                                                                                                  

Assessment:                                                                                       

04:42 General: See triage assessment . Cardiovascular: Capillary refill < 3 seconds Chest     nn1 

      pain is denied. Respiratory: Airway is patent Respiratory effort is even, unlabored,        

      Respiratory pattern is regular, symmetrical, Breath sounds are clear bilaterally. Derm:     

      Skin is pink, warm & dry.                                                                 

05:08 General: Appears in no apparent distress, comfortable. Respiratory: Airway is patent    nn1 

      Respiratory effort is even, unlabored, Respiratory pattern is regular, symmetrical.         

      Derm: Skin is pink, warm & dry.                                                           

                                                                                                  

Vital Signs:                                                                                      

04:40  / 82; Pulse 104; Resp 18; Temp 98.7(TE); Pulse Ox 97% on R/A; Weight 68.04 kg;   nn1 

      Height 5 ft. 2 in. (157.48 cm); Pain 8/10;                                                  

04:40 Body Mass Index 27.44 (68.04 kg, 157.48 cm)                                             nn1 

                                                                                                  

Vitals:                                                                                           

04:40 Log In Time N/A - ambulance arrival.                                                    nn1 

                                                                                                  

ED Course:                                                                                        

04:10 Patient visited by Lopresti, Mary-Elizabeth, Unit Clerk.                                ml3 

04:10 Patient moved to Waiting                                                                ml3 

04:10 Patient moved to 5                                                                      ml3 

04:14 El Rodriguez DO is Attending Physician.                                               cs11

04:14 Patient visited by El Rodriguez DO.                                                   cs11

04:20 Triage Initiated                                                                         

04:50 Patient moved to Radiology                                                              [facility] 

04:58 Patient moved to 5                                                                      [facility] 

05:01 Patient visited by Natacha Ortiz RN.                                                    nn1 

05:04 No IV's were initiated during this patient's visit. No procedures done that require     nn1 

      assistance.                                                                                 

05:09 The patient / caregiver is instructed regarding the plan of care and ED course.         nn1 

10:43 Chest, 2 View (pa\E\lat) Returned.                                                        EDMS

                                                                                                  

Administered Medications:                                                                         

04:59 Drug: traMADol 50 mg [tramadol 50 mg tablet (1 tabs)] Route: PO;                        nn1 

                                                                                                  

                                                                                                  

Order Results:                                                                                    

                                                                                                  

Radiology Order: Chest, 2 View (pa\E\lat)                                                         

      Test: Chest, 2 View (pa\E\lat)                                                              

      REASON FOR EXAMINATION: Chest Pain; TWO VIEW CHEST:; ; Two views of the chest are           

      performed. No consolidating infiltrate is seen.; There is mild bibasilar discoid            

      atelectasis. The heart and mediastinum are; within normal limits and the visualized         

      osseous structures appear intact.; ; IMPRESSION:; ; No acute infiltrate.; ; ; Signed        

      by; Chace Nixon MD 2017 04:52 P;                                                     

Outcome:                                                                                          

05:01 Discharge ordered by Provider.                                                          cs11

05:09 Discharge Assessment: Patient awake, alert and oriented x 3. No cognitive and/or        nn1 

      functional deficits noted. Patient verbalized understanding of disposition                  

      instructions. patient administered narcotics - yes. Pt provided with safe discharge.        

      The following High Risk Discharge criteria are identified: None. Discharged to home         

      ambulatory. Condition: stable Condition: improved. No special radiology studies were        

      completed. Property :Personal belongings accompany Pt.                                      

05:10 Patient left the ED.                                                                    nn1 

                                                                                                  

Signatures:                                                                                       

Dispatcher [employer]                           EDMS                                                 

Newman, Jill New, RN RN jan Lopresti, Mary-Elizabeth, Unit Clerk    Unit ml3                                                  

El Rodriguez DO                       DO   cs11                                                 

Sundeep Layton                                [facility]                                                  

Natacha Ortiz RN                        RN   nn1                                                  

                                                                                                  

**************************************************************************************************



*** Chart Complete ***
MTDD

## 2017-03-02 NOTE — EDDOCDS
Physician Documentation                                                                           

[facility]                                                                         

Name: Og Segal                                                                                  

Age: 31 yrs                                                                                       

Sex: Male                                                                                         

: 1985                                                                                   

MRN: [id number]                                                                                     

Arrival Date: 2017                                                                          

Time: 04:09                                                                                       

Account#: [id number]                                                                              

Bed 5                                                                                             

Private MD:                                                                                       

Disposition:                                                                                      

17 05:01 Discharged to Home/Self Care. Impression: Chronic post-thoracotomy pain.           

- Condition is Stable.                                                                            

                                                                                                  

                                                                                                  

- Medication Reconciliation, Local Pharmacy Hours form.                                           

- Follow up: Private Physician; When: Call to arrange an appointment; Reason:                     

Continuance of care.                                                                            

- Problem is chronic.                                                                             

- Symptoms are unchanged.                                                                         

                                                                                                  

                                                                                                  

                                                                                                  

Historical:                                                                                       

- Allergies: Toradol (Vomit);                                                                     

- Home Meds:                                                                                      

1. gabapentin 100 mg Oral cap 2 caps daily                                                      

     (Last dose: 2017 01:00)                                                                

2. clonazepam 0.5 mg Oral tab 1 tab 2 times per day                                             

     (Last dose: 2017 13:00)                                                                

3. Prozac 40 mg Oral cap 1 cap once daily                                                       

     (Last dose: 2017 08:00)                                                                

- PMHx: Anxiety; Cleft Palate; collapsed lung; Depression; Seizure Disorder;                      

- PSHx: pneumothorax; cleft palate;                                                               

- Social history: Smoking status: Patient uses tobacco products, heavy tobacco smoker.            

No barriers to communication noted, The patient speaks fluent English.                          

- Family history: No immediate family members are acutely ill.                                    

- : The pt / caregiver states he / she is not on anticoagulants. Home medication list             

is obtained from the patient, Unable to Verify Home Med List with the patient /                 

caregiver.                                                                                      

- Exposure Risk Screening:: None identified.                                                      

                                                                                                  

                                                                                                  

Vital Signs:                                                                                      

                                                                                             

04:40  / 82; Pulse 104; Resp 18; Temp 98.7(TE); Pulse Ox 97% on R/A; Weight 68.04 kg /  nn1 

      150 lbs; Height 5 ft. 2 in. (157.48 cm); Pain 8/10;                                         

04:40 Body Mass Index 27.44 (68.04 kg, 157.48 cm)                                             nn1 

                                                                                                  

MDM:                                                                                              

04:49 traMADol 50 mg PO once ordered.                                                         cs11

04:49 Chest, 2 View (pa\E\lat) Ordered.                                                         EDMS

                                                                                                  

Administered Medications:                                                                         

04:59 Drug: traMADol 50 mg [tramadol 50 mg tablet (1 tabs)] Route: PO;                        nn1 

                                                                                                  

                                                                                                  

Signatures:                                                                                       

Dispatcher [employer]                           EDMS                                                 

Shade Hannah New, RN                    RN   El Koehler DO DO   cs11                                                 

Natacha Ortiz RN                        RN   nn1                                                  

                                                                                                  

**************************************************************************************************



*** Chart Complete ***
MTDD

## 2017-03-02 NOTE — EDDOCDS
Physician Documentation                                                                           

[facility]                                                                         

Name: Og Segal                                                                                  

Age: 31 yrs                                                                                       

Sex: Male                                                                                         

: 1985                                                                                   

MRN: [id number]                                                                                     

Arrival Date: 2017                                                                          

Time: 04:09                                                                                       

Account#: [id number]                                                                              

Bed 5                                                                                             

Private MD:                                                                                       

Disposition:                                                                                      

17 05:01 Discharged to Home/Self Care. Impression: Chronic post-thoracotomy pain.           

- Condition is Stable.                                                                            

                                                                                                  

                                                                                                  

- Medication Reconciliation, Local Pharmacy Hours form.                                           

- Follow up: Private Physician; When: Call to arrange an appointment; Reason:                     

Continuance of care.                                                                            

- Problem is chronic.                                                                             

- Symptoms are unchanged.                                                                         

                                                                                                  

                                                                                                  

                                                                                                  

Historical:                                                                                       

- Allergies: Toradol (Vomit);                                                                     

- Home Meds:                                                                                      

1. gabapentin 100 mg Oral cap 2 caps daily                                                      

     (Last dose: 2017 01:00)                                                                

2. clonazepam 0.5 mg Oral tab 1 tab 2 times per day                                             

     (Last dose: 2017 13:00)                                                                

3. Prozac 40 mg Oral cap 1 cap once daily                                                       

     (Last dose: 2017 08:00)                                                                

- PMHx: Anxiety; Cleft Palate; collapsed lung; Depression; Seizure Disorder;                      

- PSHx: pneumothorax; cleft palate;                                                               

- Social history: Smoking status: Patient uses tobacco products, heavy tobacco smoker.            

No barriers to communication noted, The patient speaks fluent English.                          

- Family history: No immediate family members are acutely ill.                                    

- : The pt / caregiver states he / she is not on anticoagulants. Home medication list             

is obtained from the patient, Unable to Verify Home Med List with the patient /                 

caregiver.                                                                                      

- Exposure Risk Screening:: None identified.                                                      

                                                                                                  

                                                                                                  

Vital Signs:                                                                                      

                                                                                             

04:40  / 82; Pulse 104; Resp 18; Temp 98.7(TE); Pulse Ox 97% on R/A; Weight 68.04 kg /  nn1 

      150 lbs; Height 5 ft. 2 in. (157.48 cm); Pain 8/10;                                         

04:40 Body Mass Index 27.44 (68.04 kg, 157.48 cm)                                             nn1 

                                                                                                  

MDM:                                                                                              

04:49 traMADol 50 mg PO once ordered.                                                         cs11

04:49 Chest, 2 View (pa\E\lat) Ordered.                                                         EDMS

                                                                                                  

Administered Medications:                                                                         

04:59 Drug: traMADol 50 mg [tramadol 50 mg tablet (1 tabs)] Route: PO;                        nn1 

                                                                                                  

                                                                                                  

Signatures:                                                                                       

Dispatcher [employer]                           EDMS                                                 

Shade Hannah New, RN                    RN   El Koehler DO DO   cs11                                                 

Natacha Ortiz RN                        RN   nn1                                                  

                                                                                                  

**************************************************************************************************



*** Chart Complete ***
MTDD

## 2017-03-05 ENCOUNTER — HOSPITAL ENCOUNTER (INPATIENT)
Dept: HOSPITAL 53 - M ED | Age: 32
LOS: 8 days | Discharge: HOME | DRG: 754 | End: 2017-03-13
Admitting: PSYCHIATRY & NEUROLOGY
Payer: COMMERCIAL

## 2017-03-05 VITALS — WEIGHT: 153.88 LBS | BODY MASS INDEX: 28.32 KG/M2 | HEIGHT: 62 IN

## 2017-03-05 VITALS — DIASTOLIC BLOOD PRESSURE: 63 MMHG | SYSTOLIC BLOOD PRESSURE: 135 MMHG

## 2017-03-05 DIAGNOSIS — F12.90: ICD-10-CM

## 2017-03-05 DIAGNOSIS — F90.9: ICD-10-CM

## 2017-03-05 DIAGNOSIS — F17.200: ICD-10-CM

## 2017-03-05 DIAGNOSIS — F14.90: ICD-10-CM

## 2017-03-05 DIAGNOSIS — Z79.899: ICD-10-CM

## 2017-03-05 DIAGNOSIS — F32.9: Primary | ICD-10-CM

## 2017-03-05 LAB
ALBUMIN SERPL BCG-MCNC: 3.8 GM/DL (ref 3.2–5.2)
ALBUMIN/GLOB SERPL: 1.27 {RATIO} (ref 1–1.93)
ALP SERPL-CCNC: 67 U/L (ref 45–117)
ALT SERPL W P-5'-P-CCNC: 60 U/L (ref 12–78)
ANION GAP SERPL CALC-SCNC: 6 MEQ/L (ref 8–16)
AST SERPL-CCNC: 59 U/L (ref 15–37)
BILIRUB CONJ SERPL-MCNC: < 0.1 MG/DL (ref 0–0.2)
BILIRUB SERPL-MCNC: 0.2 MG/DL (ref 0.2–1)
BUN SERPL-MCNC: 7 MG/DL (ref 7–18)
CALCIUM SERPL-MCNC: 8.3 MG/DL (ref 8.5–10.1)
CHLORIDE SERPL-SCNC: 108 MEQ/L (ref 98–107)
CO2 SERPL-SCNC: 30 MEQ/L (ref 21–32)
CONTROL LINE: (no result)
CREAT SERPL-MCNC: 0.8 MG/DL (ref 0.7–1.3)
ERYTHROCYTE [DISTWIDTH] IN BLOOD BY AUTOMATED COUNT: 12.6 % (ref 11.5–14.5)
GFR SERPL CREATININE-BSD FRML MDRD: > 60 ML/MIN/{1.73_M2} (ref 60–?)
GLUCOSE SERPL-MCNC: 85 MG/DL (ref 70–105)
MCH RBC QN AUTO: 31.7 PG (ref 27–33)
MCHC RBC AUTO-ENTMCNC: 34.3 G/DL (ref 32–36.5)
MCV RBC AUTO: 92.6 FL (ref 80–96)
METHADONE UR QL SCN: NEGATIVE
PLATELET # BLD AUTO: 206 K/MM3 (ref 150–450)
POTASSIUM SERPL-SCNC: 4.3 MEQ/L (ref 3.5–5.1)
PROT SERPL-MCNC: 6.8 GM/DL (ref 6.4–8.2)
SODIUM SERPL-SCNC: 144 MEQ/L (ref 136–145)
TRICYCLIC ANTIDEPRESS URINE: NEGATIVE
WBC # BLD AUTO: 6.8 K/MM3 (ref 4–10)

## 2017-03-05 RX ADMIN — NICOTINE SCH PATCH: 21 PATCH, EXTENDED RELEASE TRANSDERMAL at 20:21

## 2017-03-05 RX ADMIN — GABAPENTIN SCH MG: 400 CAPSULE ORAL at 20:23

## 2017-03-06 VITALS — DIASTOLIC BLOOD PRESSURE: 60 MMHG | SYSTOLIC BLOOD PRESSURE: 113 MMHG

## 2017-03-06 VITALS — SYSTOLIC BLOOD PRESSURE: 117 MMHG | DIASTOLIC BLOOD PRESSURE: 57 MMHG

## 2017-03-06 RX ADMIN — GABAPENTIN SCH MG: 400 CAPSULE ORAL at 15:46

## 2017-03-06 RX ADMIN — GABAPENTIN SCH MG: 400 CAPSULE ORAL at 21:11

## 2017-03-06 RX ADMIN — GABAPENTIN SCH MG: 400 CAPSULE ORAL at 08:23

## 2017-03-06 RX ADMIN — MUPIROCIN SCH DOSE: 20 OINTMENT TOPICAL at 21:00

## 2017-03-06 RX ADMIN — MUPIROCIN SCH DOSE: 20 OINTMENT TOPICAL at 11:24

## 2017-03-06 RX ADMIN — MIRTAZAPINE SCH MG: 15 TABLET, FILM COATED ORAL at 21:11

## 2017-03-06 RX ADMIN — NICOTINE SCH PATCH: 21 PATCH, EXTENDED RELEASE TRANSDERMAL at 08:24

## 2017-03-06 NOTE — HPEPDOC
Medical History and Physical


Date of Admission


Mar 5, 2017 at 18:30





History and Physical





PCP: Dr Veronica


ATTENDING: Dr. Mathew Feliciano





HPI: 31yoF admitted to [facility] for Unspecified depressive disorder, being 

medically examined today. No acute medical complaints today. Denies any fevers, 

chills, weakness, fatigue, HA, CP, SOB, cough, palpitations, abdominal pain, N/V

/D or changes in bowel or bladder habits.





PMHx: 


Anxiety


Depression


PTSD


Cleft palate status post repair


Tobacco use


History of substance use





PSHX:


Cleft palate repair


Pneumothorax/chest tube





SOCHX:


Resides in: [place]


Marital Status: Single


Kids: None


Employment: Unemployed


Tobacco use: 2 packs per day


ETOH: 2 drinks per month


Illicit Drugs: Marijuana 3-4 times per month


IV Drug Use: Denies


Tattoos done unprofessionally: Denies 





FAMHX:


Mother: Unknown


Father: Alive, well


Siblings: One brother Alive, well


Children: None


Unexpected deaths due to medical reasons: None.





ROS:


As noted in HPI, otherwise 11pt ROS of systems reviewed and remarkable only for 

excoriation noted on his forehead which the patient states is related to 

tripping and falling in his apartment a few days ago. According to the 

emergency department records this is related to painting his head on a wall in 

the emergency department.


                 


PE:      


GEN:  31yoM, appears stated age. Well-nourished, well developed. No acute 

distress. Alert and oriented x 3. Pleasant, interactive. 


HEENT: Normocephalic, atraumatic. Pupils are equal, round, and reactive to 

light. Extraocular movements are intact. No nystagmus appreciated. Sclera are 

nonicteric. Conjunctiva without injection. Nose midline. Nasal turbinates 

without bogginess. EACs both patent BL. TMs both visualized and gray with good 

cone of light, no bulging or erythema. No facial asymmetry. Moist mucous 

membranes. Dentition fair. Pharynx pink and moist, no cobblestoning. Neck supple

, trachea midline. No lymphadenopathy or thyromegaly appreciated. 


CHEST: Regular rate and rhythm, +S1, +S2


LUNGS: Clear to auscultation bilaterally. No wheezes, rales, or rhonchi. 

Breathing appears symmetric and easy. Patient is speaking in full sentences. No 

accessory muscle use.


ABD: Round, soft, non-tender, non-distended. +Bowel sounds throughout. No 

rebound or guarding. No costovertebral angle tenderness.


EXT: Pulses 2+ bilaterally dorsalis pedis and radial. No lower extremity edema 

appreciated.


SKIN: Pink, dry, warm. Capillary refill <2sec. No rashes. There is an abrasion 

noted on the forehead. There is no drainage, minimal erythema noted.


NEURO: Alert and oriented x 3. Cranial nerves III-XII are intact. No focal 

deficits appreciated. 





EKG:  pending.


X-ray of the left hand with no fracture or dislocation.








A&P:  31yoF admitted to [facility] for Unspecified depressive disorder


1. Psych. Plan per Psychiatry. Obtain baseline EKG to assure the safety of 

psychiatric medications as they can prolong the QT interval.


2. Nicotine dependence. Patch available.


3. Abrasion forehead. Apply Bactroban twice a day as needed.


4. Follow up with PCP on discharge.


5. Substance use. Per psychiatry.


6. Elevated ALT. Recheck CMP in a.m.


7. Staff member present throughout exam, sushila Cedillo.





Vital Signs





 Vital Signs








Label Value  Date Time


 


Patient Temperature 97.4 degrees F 3/6/17 0621


 


Temperature Source Tympanic 3/6/17 0621


 


Pulse 68 3/6/17 0621


 


Respiratory Rate 20 bpm 3/6/17 0621


 


Blood Pressure Assessment 117/57 (77) 3/6/17 0621











Laboratory Data


Labs 24H


 Laboratory Tests 2


3/5/17 14:32: 


Acetaminophen Level < 2.0L, Aspartate Amino Transf (AST/SGOT) 59H, Alanine 

Aminotransferase (ALT/SGPT) 60, Alkaline Phosphatase 67, Total Bilirubin 0.2, 

Direct Bilirubin < 0.1, Albumin 3.8, Albumin/Globulin Ratio 1.27, Anion Gap 6L, 

Calcium Level 8.3L, Ethyl Alcohol Level 0.133H, Glomerular Filtration Rate > 

60.0, Salicylates Level 4.1L, Thyroid Stimulating Hormone (TSH) 0.473, Total 

Protein 6.8


3/5/17 14:33: 


Urine Amphetamines Screen NEGATIVE, Urine Benzodiazepines Screen POSITIVEH, 

Urine Opiates Screen NEGATIVE, Urine Barbiturates Screen NEGATIVE, Urine 

Cannabinoids Screen POSITIVEH, Urine Cocaine Metabolite Screen POSITIVEH, Urine 

Methadone Screen NEGATIVE, Urine Tricyclic Antidepressants NEGATIVE


CBC/BMP


 Laboratory Tests


3/5/17 14:32








Red Blood Count 4.32, Mean Corpuscular Volume 92.6, Mean Corpuscular Hemoglobin 

31.7, Mean Corpuscular Hemoglobin Concent 34.3, Red Cell Distribution Width 12.6





Home Medications


Scheduled


Clonazepam (Klonopin) 0.5 Mg Tab 0.5 MG PO BID  


Fluoxetine HCl (Prozac) 40 Mg Cap 40 MG PO DAILY  


Gabapentin (Gabapentin) 800 Mg Tab 800 MG PO TID  


Mirtazapine (Remeron) 45 Mg Tab 45 MG PO QHS  


Trazodone HCl (Trazodone HCl) 50 Mg Tab 50 MG PO TID  





Allergies


Coded Allergies:  


     No Known Allergies (Unverified , 9/24/16)








Nohelia Blake Mar 6, 2017 09:27

## 2017-03-06 NOTE — ECGEPIP
Stationary ECG Study

                              [facility]

                                       

                                       Test Date:    2017

Pat Name:     [address]               Department:   

Patient ID:   [id number]                 Room:         [place] [address]

Gender:       M                        Technician:   

:          1985               Requested By: Nohelia Blake 

Order Number: [id number]     Reading MD:   Tequila Riley

                                 Measurements

Intervals                              Axis          

Rate:         83                       P:            50

TX:           128                      QRS:          51

QRSD:         89                       T:            10

QT:           354                                    

QTc:          416                                    

                           Interpretive Statements

SINUS RHYTHM

t wave abn        EARLY REPOLAR/PERICARDITIS OTHER   no prior

Electronically Signed On 3-6-2017 14:59:00 EST by Tequila Riley

## 2017-03-06 NOTE — REP
LEFT HAND, FOUR VIEWS:

 

There is no evidence of an acute fracture, dislocation or intrinsic bone

disease.

 

IMPRESSION:

 

No fracture or dislocation.

 

 

Signed by

Chace Nixon MD 03/06/2017 04:10 P

## 2017-03-07 VITALS — DIASTOLIC BLOOD PRESSURE: 64 MMHG | SYSTOLIC BLOOD PRESSURE: 124 MMHG

## 2017-03-07 VITALS — DIASTOLIC BLOOD PRESSURE: 57 MMHG | SYSTOLIC BLOOD PRESSURE: 111 MMHG

## 2017-03-07 LAB
ALBUMIN SERPL BCG-MCNC: 3.2 GM/DL (ref 3.2–5.2)
ALBUMIN/GLOB SERPL: 1.1 {RATIO} (ref 1–1.93)
ALP SERPL-CCNC: 67 U/L (ref 45–117)
ALT SERPL W P-5'-P-CCNC: 38 U/L (ref 12–78)
ANION GAP SERPL CALC-SCNC: 11 MEQ/L (ref 8–16)
AST SERPL-CCNC: 20 U/L (ref 15–37)
BILIRUB SERPL-MCNC: 0.2 MG/DL (ref 0.2–1)
BUN SERPL-MCNC: 10 MG/DL (ref 7–18)
CALCIUM SERPL-MCNC: 8.5 MG/DL (ref 8.5–10.1)
CHLORIDE SERPL-SCNC: 107 MEQ/L (ref 98–107)
CO2 SERPL-SCNC: 26 MEQ/L (ref 21–32)
CREAT SERPL-MCNC: 0.91 MG/DL (ref 0.7–1.3)
GFR SERPL CREATININE-BSD FRML MDRD: > 60 ML/MIN/{1.73_M2} (ref 60–?)
GLUCOSE SERPL-MCNC: 125 MG/DL (ref 70–105)
POTASSIUM SERPL-SCNC: 4.2 MEQ/L (ref 3.5–5.1)
PROT SERPL-MCNC: 6.1 GM/DL (ref 6.4–8.2)
SODIUM SERPL-SCNC: 144 MEQ/L (ref 136–145)

## 2017-03-07 RX ADMIN — MIRTAZAPINE SCH MG: 15 TABLET, FILM COATED ORAL at 20:17

## 2017-03-07 RX ADMIN — NICOTINE SCH PATCH: 21 PATCH, EXTENDED RELEASE TRANSDERMAL at 08:45

## 2017-03-07 RX ADMIN — GABAPENTIN SCH MG: 400 CAPSULE ORAL at 08:45

## 2017-03-07 RX ADMIN — MUPIROCIN SCH DOSE: 20 OINTMENT TOPICAL at 20:18

## 2017-03-07 RX ADMIN — GABAPENTIN SCH MG: 400 CAPSULE ORAL at 16:00

## 2017-03-07 RX ADMIN — MUPIROCIN SCH DOSE: 20 OINTMENT TOPICAL at 08:47

## 2017-03-07 RX ADMIN — GABAPENTIN SCH MG: 400 CAPSULE ORAL at 20:18
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
## 2017-03-07 NOTE — IPNPDOC
[facility] Progress Note


Progress Note


DATE OF SERVICE: 3/7/17





HISTORY: Patient is 31-year-old male with history of ADHD, depression and 

benzodiazepine abuse, notes he had been prescribed Klonopin "for the past month,

" informs writer today he did not abuse medications which is contrary to ER 

report at time of admission. Per ER report, patient also apparently made 

threatening statements to girlfriend and indicated he was going to kill himself 

by way of overdose, and misusing prescribed benzodiazepines. Patient reports 

low level anxiety and depression, denies suicidal and homicidal ideation, 

denies audiovisual hallucinations, denies urge to engage in self-injurious 

behavior. Patient is in agreement with benzodiazepine taper, indicates current 

medication regimen is effective and he denies medication side effects. Patient 

opens conversation with writer today by asking when he will be discharged, is 

responsive when informed discharge date has been set at this time. Patient has 

been visible and is attending groups. Patient drastically minimizes his 

symptoms and is superficially compliant, indicates he is experiencing good sleep

, stable appetite, and denies challenges with concentration and focus or energy 

level. Patient exhibits no signs of agitation or anger.





VITAL SIGNS: See below.





NEW TEST RESULTS: No new results


MEDICAL HISTORY: Cleft palate repair, pneumothorax/chest tube


LABS AT ADMISSION:  His CBC showed a hemoglobin of 13.7, hematocrit 40.  Rest


within normal limits.  CMP is unremarkable except AST of 59.  Urine drug screen


is positive for benzodiazepine, cocaine and cannabis.  Blood alcohol level of


0.013.


EKG 3/6/17 sinus rhythm T-wave abnormality early repolarization pericarditis 

other no prior





CURRENT MEDICATIONS: See below.





MENTAL STATUS EXAMINATION: Patient is dressed in [facility].  Patient is 

cooperative.  His speech is past and pressured.  Has fair eye contact.  Mood is 

anxious and depressed and irritated.  Affect is labile and congruent with mood. 

Patient is oriented to time, place, person and situation.  Attention and 

concentration are impaired.  Memory is fair.  Patient is fully oriented, 

sensations are intact, thinking is logical, and thought content is appropriate. 

Patient denies auditory or visual hallucinations.  No evidence of delusions.  

Patient denies suicidal or homicidal ideation. Judgment and insight are poor.





DIAGNOSES: Unspecified depressive disorder, Attention deficit hyperactivity 

disorder by history, polysubstance use disorder, rule out substance-induced 

mood disorder 


 


ASSESSMENT: Patient is adjusting to unit, has indicated that he is in agreement 

with benzodiazepine taper. Patient denies suicidal and homicidal thinking and 

is able to verbalize how to access supportive services on the unit if needed. 

Patient is superficially compliant and minimizes his substance use/abuse, 

further minimizes the events which led to his current hospitalization. Will 

continue to monitor patient's response to medications and benzo taper, monitor 

for side effects, and evaluate resolution of suicidal thinking and agitation, 

and discharge readiness. Patient indicates when ready he plans to discharge to 

home with girlfriend and participate in outpatient psychiatric services, has 

been encouraged to consider inpatient substance abuse treatment, patient is 

declining at this time.





MANAGEMENT PLAN: Continue Librium taper, reduce Librium to 5 mg po BID, 

continue mirtazapine 45 mg po q hs, gabapentin 800 mg po TID, and Prozac 40 mg 

po q am 


Maintain safety precautions


Patient to attend groups and participate in unit programming to develop coping 

strategies


Engage patient in discharge planning process and arrange meeting with patient's 

support system to ensure safe discharge planning when appropriate


Patient to follow up with PCM upon discharge





TIME SPENT: 35  minutes.





Vital Signs





 Vital Signs








  Date Time  Temp Pulse Resp B/P Pulse Ox O2 Delivery O2 Flow Rate FiO2


 


3/7/17 09:00      Room Air  


 


3/7/17 06:24 96.7 71 18 111/57    


 


3/5/17 18:15     99   











Laboratory Data


[facility] Labs


 Laboratory Tests 2


3/7/17 06:44: 


Blood Urea Nitrogen 10, Creatinine 0.91, Sodium Level 144, Potassium Level 4.2, 

Chloride Level 107, Carbon Dioxide Level 26, Calcium Level 8.5, Aspartate Amino 

Transf (AST/SGOT) 20, Alanine Aminotransferase (ALT/SGPT) 38, Alkaline 

Phosphatase 67, Total Bilirubin 0.2, Total Protein 6.1L, Albumin 3.2, Albumin/

Globulin Ratio 1.10, Anion Gap 11, Glomerular Filtration Rate > 60.0


CBC/BMP


 Laboratory Tests


3/7/17 06:44








Calcium Level 8.5, Aspartate Amino Transf (AST/SGOT) 20, Alanine 

Aminotransferase (ALT/SGPT) 38, Alkaline Phosphatase 67, Total Bilirubin 0.2, 

Total Protein 6.1 L, Albumin 3.2





Current Medications





 Current Medications


Acetaminophen (Tylenol Tab) 650 mg Q6HP  PRN PO HEADACHE or DISCOMFORT;  Start 3

/5/17 at 18:00;  Stop 4/4/17 at 17:59


Al Hydrox/Mg Hydrox/Simethicone (Mylanta) 30 ml Q4HP  PRN PO HEARTBURN/

INDIGESTION;  Start 3/5/17 at 18:00;  Stop 4/4/17 at 17:59


Chlordiazepoxide (Librium) 5 mg BID PO ;  Start 3/8/17 at 09:00;  Stop 3/15/17 

at 08:59;  Status UNV


Chlordiazepoxide (Librium) 10 mg BID PO  Last administered on 3/7/17at 08:45;  

Start 3/6/17 at 09:00;  Stop 3/7/17 at 18:57;  Status DC


Chlordiazepoxide (Librium) 10 mg QHS PO ;  Start 3/8/17 at 21:00;  Stop 3/8/17 

at 21:00;  Status DC


Chlordiazepoxide (Librium) 25 mg TIDP  PRN PO SEE LABEL COMMENTS Last 

administered on 3/5/17at 21:44;  Start 3/5/17 at 18:00;  Stop 3/12/17 at 17:59


Fluoxetine HCl (PROzac) 40 mg DAILY PO  Last administered on 3/7/17at 08:45;  

Start 3/5/17 at 09:00;  Stop 4/4/17 at 08:59


Fluoxetine HCl (PROzac) 40 mg DAILY PO ;  Start 3/6/17 at 09:00;  Stop 3/6/17 

at 09:00;  Status DC


Gabapentin (Neurontin) 800 mg TID PO ;  Start 3/5/17 at 21:00;  Stop 3/5/17 at 

21:00;  Status DC


Gabapentin (Neurontin) 800 mg TID PO  Last administered on 3/7/17at 16:00;  

Start 3/5/17 at 21:00;  Stop 4/4/17 at 20:59


Home Med (Med Rec Complete!)  ASDIRECTED XX ;  Start 3/5/17 at 18:15;  Stop 3/5/

17 at 18:28;  Status DC


Home Med (Med Rec Complete!)  ASDIRECTED XX ;  Start 3/5/17 at 18:30;  Stop 3/5/

17 at 18:39;  Status DC


Magnesium Hydroxide (Milk Of Magnesia) 30 ml DAILYPRN  PRN PO CONSTIPATION;  

Start 3/5/17 at 18:00;  Stop 4/4/17 at 17:59


Mirtazapine (Remeron) 45 mg QHS PO  Last administered on 3/6/17at 21:11;  Start 

3/6/17 at 21:00;  Stop 4/5/17 at 20:59


Mupirocin (Bactroban 2% Ointment) 1 dose BID TOP  Last administered on 3/6/17at 

11:24;  Start 3/6/17 at 09:00;  Stop 4/5/17 at 08:59


Nicotine (Nicoderm Cq 21mg) 1 patch DAILY TD  Last administered on 3/7/17at 08:

45;  Start 3/5/17 at 09:00;  Stop 4/4/17 at 08:59


Trazodone HCl (Desyrel) 100 mg QHSP  PRN PO INSOMNIA Last administered on 3/5/

17at 20:23;  Start 3/5/17 at 18:00;  Stop 4/4/17 at 17:59





Allergies


Coded Allergies:  


     No Known Allergies (Unverified , 9/24/16)








Isabella Godoy Mar 7, 2017 19:14

## 2017-03-08 VITALS — DIASTOLIC BLOOD PRESSURE: 60 MMHG | SYSTOLIC BLOOD PRESSURE: 124 MMHG

## 2017-03-08 VITALS — SYSTOLIC BLOOD PRESSURE: 103 MMHG | DIASTOLIC BLOOD PRESSURE: 51 MMHG

## 2017-03-08 RX ADMIN — MIRTAZAPINE SCH MG: 15 TABLET, FILM COATED ORAL at 20:39

## 2017-03-08 RX ADMIN — GABAPENTIN SCH MG: 400 CAPSULE ORAL at 20:39

## 2017-03-08 RX ADMIN — GABAPENTIN SCH MG: 400 CAPSULE ORAL at 15:28

## 2017-03-08 RX ADMIN — MUPIROCIN SCH DOSE: 20 OINTMENT TOPICAL at 20:43

## 2017-03-08 RX ADMIN — NICOTINE SCH PATCH: 21 PATCH, EXTENDED RELEASE TRANSDERMAL at 08:49

## 2017-03-08 RX ADMIN — MUPIROCIN SCH DOSE: 20 OINTMENT TOPICAL at 08:46

## 2017-03-08 RX ADMIN — GABAPENTIN SCH MG: 400 CAPSULE ORAL at 08:50

## 2017-03-08 NOTE — IPNPDOC
[facility] Progress Note


Progress Note


DATE OF SERVICE: 3/8/17





HISTORY: Patient is 31-year-old male with history of ADHD, depression and 

benzodiazepine abuse, notes he had been prescribed Klonopin X 1 month, denies 

abusing medications which is contrary to ER intake report. Writer met with 

patient today to assess treatment progress on the inpatient unit. Patient 

remains isolative to room stating, "I don't like groups and closed up spaces, 

it stems from me being in half-way." Patient reports reduced anxiety and depression

, denies suicidal and homicidal ideation, denies audiovisual hallucinations, 

denies urge to engage in self-injurious behavior. Patient engages poorly for 

interaction, is superficially bright, superficially compliant, and minimizes 

symptoms and substance abuse. Patient remains in agreement with benzodiazepine 

taper, adds medication regimen remains effective and denies medication side 

effects. Patient denies challenges with sleep, concentration, or energy level, 

states appetite is improving. Patient remains fixated on discharge, notes he is 

disinterested in substance abuse treatment, but will resume treatment with 

previous outpatient provider upon discharge from hospital. Patient exhibits no 

signs of agitation or anger.





VITAL SIGNS: See below.





NEW TEST RESULTS: No new results


MEDICAL HISTORY: Cleft palate repair, pneumothorax/chest tube


LABS AT ADMISSION:  His CBC showed a hemoglobin of 13.7, hematocrit 40.  Rest


within normal limits.  CMP is unremarkable except AST of 59.  Urine drug screen


is positive for benzodiazepine, cocaine and cannabis.  Blood alcohol level of


0.013.


EKG 3/6/17 sinus rhythm T-wave abnormality early repolarization pericarditis 

other no prior





CURRENT MEDICATIONS: See below.





MENTAL STATUS EXAMINATION ON DISCHARGE: 


Patient is a 31 -year-old male who is pleasant and superficially cooperative, 

appears mildly disheveled, makes fair eye contact, ambulates with steady gait, 

appears stated age 


Speech is mildly pressured, otherwise generally normal rhythm and volume, 

coherent.


Language skills are intact.


Thought processes including: Clear, goal-directed, fixated on discharge


Thought content: Rational, logical.


Abstract reasoning, and computation: Requires further evaluation.


Description of associations: Appear intact.


Description of abnormal or psychotic thoughts: denies hallucinations, delusions

, preoccupation with violence, homicidal or suicidal ideation, and obsessions].


Judgment: Poor.


Insight: Poor.


Orientation to [time, place and person].


Recent and remote memory: [Immediate, short-term and long-term memory appear 

intact].


Attention span and concentration: Limited.


Language: [Normal].


Fund of knowledge: Adequate.


Mood: "I feel fine, no problems with me." Patient appears less depressed and 

anxious, no mood lability noted, is superficially compliant, demise his symptoms


Affect: Constricted, generally congruent with mood 





DIAGNOSES: Unspecified depressive disorder, Attention deficit hyperactivity 

disorder by history, polysubstance use disorder, rule out substance-induced 

mood disorder 


 


ASSESSMENT: Patient continues to adjust to unit, is observed to be visible on 

unit at times, otherwise isolates to room, has not been attending groups. 

Patient denies suicidal and homicidal thinking and is able to verbalize how to 

access supportive services on the unit if needed. Patient is superficially 

compliant and minimizes his substance use/abuse, further minimizes the events 

which led to his current hospitalization. Will continue to monitor patient's 

response to medications and benzo taper, monitor for side effects, and evaluate 

resolution of suicidal thinking and agitation, and discharge readiness. Patient 

indicates when ready he plans to discharge to home with girlfriend and 

participate in outpatient psychiatric services, has been encouraged to consider 

inpatient substance abuse treatment, patient is declining at this time.





MANAGEMENT PLAN: Continue Librium taper: Librium to 5 mg po BID, continue 

mirtazapine 45 mg po q hs, gabapentin 800 mg po TID, and Prozac 40 mg po q am 


Maintain safety precautions


Patient to attend groups and participate in unit programming to develop coping 

strategies


Engage patient in discharge planning process and arrange meeting with patient's 

support system to ensure safe discharge planning when appropriate


Patient to follow up with PCM upon discharge





TIME SPENT: 35  minutes.





Vital Signs





 Vital Signs








  Date Time  Temp Pulse Resp B/P Pulse Ox O2 Delivery O2 Flow Rate FiO2


 


3/8/17 06:11 96.1 62 18 103/51    


 


3/7/17 09:00      Room Air  


 


3/5/17 18:15     99   











Current Medications





 Current Medications


Acetaminophen (Tylenol Tab) 650 mg Q6HP  PRN PO HEADACHE or DISCOMFORT;  Start 3

/5/17 at 18:00;  Stop 4/4/17 at 17:59


Al Hydrox/Mg Hydrox/Simethicone (Mylanta) 30 ml Q4HP  PRN PO HEARTBURN/

INDIGESTION;  Start 3/5/17 at 18:00;  Stop 4/4/17 at 17:59


Chlordiazepoxide (Librium) 5 mg BID PO  Last administered on 3/8/17at 08:50;  

Start 3/8/17 at 09:00;  Stop 3/15/17 at 08:59


Chlordiazepoxide (Librium) 10 mg BID PO  Last administered on 3/7/17at 08:45;  

Start 3/6/17 at 09:00;  Stop 3/7/17 at 18:57;  Status DC


Chlordiazepoxide (Librium) 10 mg QHS PO ;  Start 3/8/17 at 21:00;  Stop 3/8/17 

at 21:00;  Status DC


Chlordiazepoxide (Librium) 25 mg TIDP  PRN PO SEE LABEL COMMENTS Last 

administered on 3/5/17at 21:44;  Start 3/5/17 at 18:00;  Stop 3/12/17 at 17:59


Fluoxetine HCl (PROzac) 40 mg DAILY PO  Last administered on 3/8/17at 08:50;  

Start 3/5/17 at 09:00;  Stop 4/4/17 at 08:59


Fluoxetine HCl (PROzac) 40 mg DAILY PO ;  Start 3/6/17 at 09:00;  Stop 3/6/17 

at 09:00;  Status DC


Gabapentin (Neurontin) 800 mg TID PO ;  Start 3/5/17 at 21:00;  Stop 3/5/17 at 

21:00;  Status DC


Gabapentin (Neurontin) 800 mg TID PO  Last administered on 3/8/17at 08:50;  

Start 3/5/17 at 21:00;  Stop 4/4/17 at 20:59


Home Med (Med Rec Complete!)  ASDIRECTED XX ;  Start 3/5/17 at 18:15;  Stop 3/5/

17 at 18:28;  Status DC


Home Med (Med Rec Complete!)  ASDIRECTED XX ;  Start 3/5/17 at 18:30;  Stop 3/5/

17 at 18:39;  Status DC


Magnesium Hydroxide (Milk Of Magnesia) 30 ml DAILYPRN  PRN PO CONSTIPATION;  

Start 3/5/17 at 18:00;  Stop 4/4/17 at 17:59


Mirtazapine (Remeron) 45 mg QHS PO  Last administered on 3/7/17at 20:17;  Start 

3/6/17 at 21:00;  Stop 4/5/17 at 20:59


Mupirocin (Bactroban 2% Ointment) 1 dose BID TOP  Last administered on 3/6/17at 

11:24;  Start 3/6/17 at 09:00;  Stop 4/5/17 at 08:59


Nicotine (Nicoderm Cq 21mg) 1 patch DAILY TD  Last administered on 3/8/17at 08:

49;  Start 3/5/17 at 09:00;  Stop 4/4/17 at 08:59


Trazodone HCl (Desyrel) 100 mg QHSP  PRN PO INSOMNIA Last administered on 3/5/

17at 20:23;  Start 3/5/17 at 18:00;  Stop 3/7/17 at 19:14;  Status DC





Allergies


Coded Allergies:  


     No Known Allergies (Unverified , 9/24/16)








Isabella Godoy Mar 8, 2017 13:29

## 2017-03-09 VITALS — SYSTOLIC BLOOD PRESSURE: 106 MMHG | DIASTOLIC BLOOD PRESSURE: 69 MMHG

## 2017-03-09 VITALS — DIASTOLIC BLOOD PRESSURE: 60 MMHG | SYSTOLIC BLOOD PRESSURE: 127 MMHG

## 2017-03-09 RX ADMIN — GABAPENTIN SCH MG: 400 CAPSULE ORAL at 20:36

## 2017-03-09 RX ADMIN — MIRTAZAPINE SCH MG: 15 TABLET, FILM COATED ORAL at 20:36

## 2017-03-09 RX ADMIN — NICOTINE SCH PATCH: 21 PATCH, EXTENDED RELEASE TRANSDERMAL at 08:58

## 2017-03-09 RX ADMIN — GABAPENTIN SCH MG: 400 CAPSULE ORAL at 14:51

## 2017-03-09 RX ADMIN — MUPIROCIN SCH DOSE: 20 OINTMENT TOPICAL at 20:34

## 2017-03-09 RX ADMIN — GABAPENTIN SCH MG: 400 CAPSULE ORAL at 08:58

## 2017-03-09 RX ADMIN — MUPIROCIN SCH DOSE: 20 OINTMENT TOPICAL at 08:54

## 2017-03-10 VITALS — DIASTOLIC BLOOD PRESSURE: 60 MMHG | SYSTOLIC BLOOD PRESSURE: 125 MMHG

## 2017-03-10 VITALS — DIASTOLIC BLOOD PRESSURE: 57 MMHG | SYSTOLIC BLOOD PRESSURE: 111 MMHG

## 2017-03-10 RX ADMIN — MIRTAZAPINE SCH MG: 15 TABLET, FILM COATED ORAL at 20:33

## 2017-03-10 RX ADMIN — MUPIROCIN SCH DOSE: 20 OINTMENT TOPICAL at 08:18

## 2017-03-10 RX ADMIN — GABAPENTIN SCH MG: 400 CAPSULE ORAL at 20:34

## 2017-03-10 RX ADMIN — GABAPENTIN SCH MG: 400 CAPSULE ORAL at 08:17

## 2017-03-10 RX ADMIN — NICOTINE SCH PATCH: 21 PATCH, EXTENDED RELEASE TRANSDERMAL at 08:17

## 2017-03-10 RX ADMIN — GABAPENTIN SCH MG: 400 CAPSULE ORAL at 15:46

## 2017-03-10 RX ADMIN — MUPIROCIN SCH DOSE: 20 OINTMENT TOPICAL at 20:32

## 2017-03-10 NOTE — IPNPDOC
[facility] Progress Note


Progress Note


DATE OF SERVICE: 3/10/17





HISTORY: Patient is 31-year-old male with history of ADHD, depression and 

benzodiazepine abuse, notes he had been prescribed Klonopin X 1 month, denies 

abusing medications which is contrary to ER intake report. Writer met with 

patient today to assess treatment progress on the inpatient unit. Patient 

states he continues to make more effort to be visible on unit and attend groups

, reiterates today, "you know, I don't like closed up spaces from being in half-way.

" Patient today denies symptoms of anxiety and depression, denies suicidal and 

homicidal ideation, denies audiovisual hallucinations, denies urge to engage in 

self-injurious behavior. Patient is on Librium taper and denies symptoms of 

craving or withdrawal. Patient engages better today for interaction, is less 

superficially bright, less superficially compliant, today verbalizes insight 

retaining to history of drug and alcohol abuse, speaks openly with writer about 

girlfriend who recently temporarily moved to [facility] due to patient's substance 

abuse. Patient indicates his girlfriend is now willing to reengage with patient 

and patient states he is hopeful that he will remain clean and sober so that he 

can participate in a healthy elation chip. Patient adds medication regimen 

remains effective and denies medication side effects. Patient denies challenges 

with sleep, concentration, or energy level, states appetite is improving. 

Patient is less fixated on discharge today, states he is hopeful he will be 

discharged Monday and today verbalizes agreement that he could benefit from 

outpatient substance abuse treatment, states [facility], and 

[facility], and AA. Patient exhibits no signs of agitation or anger, denies symptoms 

physical pain, and presents with no indication of acute distress at time of 

interaction.





VITAL SIGNS: See below.





NEW TEST RESULTS: No new results


MEDICAL HISTORY: Cleft palate repair, pneumothorax/chest tube


LABS AT ADMISSION:  His CBC showed a hemoglobin of 13.7, hematocrit 40.  Rest


within normal limits.  CMP is unremarkable except AST of 59.  Urine drug screen


is positive for benzodiazepine, cocaine and cannabis.  Blood alcohol level of


0.013.


EKG 3/6/17 sinus rhythm T-wave abnormality early repolarization pericarditis 

other no prior





CURRENT MEDICATIONS: See below.





MENTAL STATUS EXAMINATION ON DISCHARGE: 


Patient is a 31 -year-old male who is pleasant and less superficially 

cooperative, appears less disheveled, is dressed in hospital clothing, makes 

improved eye contact, ambulates with steady gait, appears stated age 


Speech is of normal rate, rhythm, volume, coherent, more spontaneous 


Language skills are intact.


Thought processes including: Clear, goal-directed, fixated on discharge


Thought content: Rational, logical.


Abstract reasoning: Adequate


Description of associations: Appear intact.


Description of abnormal or psychotic thoughts: denies hallucinations, delusions

, preoccupation with violence, homicidal or suicidal ideation, and obsessions].


Judgment: Limited, continues to improve 


Insight: Limited, continues to improve expresses some improvement


Orientation to time, place and person.


Recent and remote memory: Immediate, short-term and long-term memory appear 

intact.


Attention span and concentration: Limited, some improvement.


Language: Normal.


Fund of knowledge: Adequate.


Mood: "I feel better. I want to be clean and sober." Patient appears less 

depressed and anxious, no mood lability noted, is superficially compliant, 

demise his symptoms


Affect: Constricted, brightens at times appropriately, generally congruent with 

mood 





DIAGNOSES: Unspecified depressive disorder, Attention deficit hyperactivity 

disorder by history, polysubstance use disorder, rule out substance-induced 

mood disorder 


 


ASSESSMENT: Patient continues to adjust to unit, is observed to be somewhat 

more visible on unit, isolates to room less, has been making increased effort 

to attend groups. Patient denies suicidal and homicidal thinking and is able to 

verbalize how to access supportive services on the unit if needed. Patient is 

less superficially compliant and is minimizing less his substance use/abuse, 

seems to be making progress in terms of understanding the gravity of events 

which led to his current hospitalization. Will continue to monitor patient's 

response to medications and benzo taper, hydroxyzine added in the event patient 

experiences anxiety over weekend, patient indicates he has taken before with 

good effect. Will continue to monitor for medication side effects, evaluate 

resolution of suicidal thinking and agitation, patient safety and discharge 

readiness. Patient indicates when ready he plans to discharge to home with 

girlfriend and participate in outpatient psychiatric services, has been 

encouraged to consider inpatient substance abuse treatment, patient is 

declining at this time but agrees to consider.





MANAGEMENT PLAN: Continue Librium taper: Librium to 5 mg po tonight then stop. 

Continue mirtazapine 45 mg po q hs, gabapentin 800 mg po TID, and Prozac 40 mg 

po q am. Initiate hydroxyzine 50 mg po q 8 hours PRN anxiety. 


Maintain safety precautions


Patient to attend groups and participate in unit programming to develop coping 

strategies


Engage patient in discharge planning process and arrange meeting with patient's 

support system to ensure safe discharge planning when appropriate


Patient to follow up with PCM upon discharge





TIME SPENT: 35  minutes.





Vital Signs





 Vital Signs








  Date Time  Temp Pulse Resp B/P Pulse Ox O2 Delivery O2 Flow Rate FiO2


 


3/10/17 06:38 96.8 66 16 111/57    


 


3/7/17 09:00      Room Air  


 


3/5/17 18:15     99   











Current Medications





 Current Medications


Acetaminophen (Tylenol Tab) 650 mg Q6HP  PRN PO HEADACHE or DISCOMFORT Last 

administered on 3/9/17at 14:50;  Start 3/5/17 at 18:00;  Stop 4/4/17 at 17:59


Al Hydrox/Mg Hydrox/Simethicone (Mylanta) 30 ml Q4HP  PRN PO HEARTBURN/

INDIGESTION;  Start 3/5/17 at 18:00;  Stop 4/4/17 at 17:59


Chlordiazepoxide (Librium) 5 mg BID PO  Last administered on 3/9/17at 20:36;  

Start 3/8/17 at 09:00;  Stop 3/9/17 at 23:59;  Status DC


Chlordiazepoxide (Librium) 5 mg BID PO ;  Start 3/9/17 at 22:00;  Stop 3/9/17 

at 22:00;  Status DC


Chlordiazepoxide (Librium) 5 mg QHS PO ;  Start 3/10/17 at 21:00;  Stop 3/10/17 

at 23:59


Chlordiazepoxide (Librium) 10 mg BID PO  Last administered on 3/7/17at 08:45;  

Start 3/6/17 at 09:00;  Stop 3/7/17 at 18:57;  Status DC


Chlordiazepoxide (Librium) 10 mg QHS PO ;  Start 3/8/17 at 21:00;  Stop 3/8/17 

at 21:00;  Status DC


Chlordiazepoxide (Librium) 25 mg TIDP  PRN PO SEE LABEL COMMENTS Last 

administered on 3/5/17at 21:44;  Start 3/5/17 at 18:00;  Stop 3/12/17 at 17:59


Fluoxetine HCl (PROzac) 40 mg DAILY PO  Last administered on 3/10/17at 08:17;  

Start 3/5/17 at 09:00;  Stop 4/4/17 at 08:59


Fluoxetine HCl (PROzac) 40 mg DAILY PO ;  Start 3/6/17 at 09:00;  Stop 3/6/17 

at 09:00;  Status DC


Gabapentin (Neurontin) 800 mg TID PO ;  Start 3/5/17 at 21:00;  Stop 3/5/17 at 

21:00;  Status DC


Gabapentin (Neurontin) 800 mg TID PO  Last administered on 3/10/17at 08:17;  

Start 3/5/17 at 21:00;  Stop 4/4/17 at 20:59


Home Med (Med Rec Complete!)  ASDIRECTED XX ;  Start 3/5/17 at 18:15;  Stop 3/5/

17 at 18:28;  Status DC


Home Med (Med Rec Complete!)  ASDIRECTED XX ;  Start 3/5/17 at 18:30;  Stop 3/5/

17 at 18:39;  Status DC


Magnesium Hydroxide (Milk Of Magnesia) 30 ml DAILYPRN  PRN PO CONSTIPATION;  

Start 3/5/17 at 18:00;  Stop 4/4/17 at 17:59


Mirtazapine (Remeron) 45 mg QHS PO  Last administered on 3/9/17at 20:36;  Start 

3/6/17 at 21:00;  Stop 4/5/17 at 20:59


Mupirocin (Bactroban 2% Ointment) 1 dose BID TOP  Last administered on 3/6/17at 

11:24;  Start 3/6/17 at 09:00;  Stop 4/5/17 at 08:59


Nicotine (Nicoderm Cq 21mg) 1 patch DAILY TD  Last administered on 3/10/17at 08:

17;  Start 3/5/17 at 09:00;  Stop 4/4/17 at 08:59


Trazodone HCl (Desyrel) 100 mg QHSP  PRN PO INSOMNIA Last administered on 3/5/

17at 20:23;  Start 3/5/17 at 18:00;  Stop 3/7/17 at 19:14;  Status DC





Allergies


Coded Allergies:  


     No Known Allergies (Unverified , 9/24/16)








Isabella Godoy Mar 10, 2017 14:26

## 2017-03-11 VITALS — DIASTOLIC BLOOD PRESSURE: 81 MMHG | SYSTOLIC BLOOD PRESSURE: 137 MMHG

## 2017-03-11 VITALS — DIASTOLIC BLOOD PRESSURE: 58 MMHG | SYSTOLIC BLOOD PRESSURE: 115 MMHG

## 2017-03-11 RX ADMIN — MIRTAZAPINE SCH MG: 15 TABLET, FILM COATED ORAL at 20:28

## 2017-03-11 RX ADMIN — GABAPENTIN SCH MG: 400 CAPSULE ORAL at 08:00

## 2017-03-11 RX ADMIN — GABAPENTIN SCH MG: 400 CAPSULE ORAL at 20:29

## 2017-03-11 RX ADMIN — MUPIROCIN SCH DOSE: 20 OINTMENT TOPICAL at 07:58

## 2017-03-11 RX ADMIN — MUPIROCIN SCH DOSE: 20 OINTMENT TOPICAL at 20:29

## 2017-03-11 RX ADMIN — GABAPENTIN SCH MG: 400 CAPSULE ORAL at 15:40

## 2017-03-11 RX ADMIN — NICOTINE SCH PATCH: 21 PATCH, EXTENDED RELEASE TRANSDERMAL at 08:00

## 2017-03-12 VITALS — SYSTOLIC BLOOD PRESSURE: 127 MMHG | DIASTOLIC BLOOD PRESSURE: 65 MMHG

## 2017-03-12 VITALS — DIASTOLIC BLOOD PRESSURE: 77 MMHG | SYSTOLIC BLOOD PRESSURE: 119 MMHG

## 2017-03-12 RX ADMIN — GABAPENTIN SCH MG: 400 CAPSULE ORAL at 20:24

## 2017-03-12 RX ADMIN — MUPIROCIN SCH DOSE: 20 OINTMENT TOPICAL at 09:00

## 2017-03-12 RX ADMIN — GABAPENTIN SCH MG: 400 CAPSULE ORAL at 09:43

## 2017-03-12 RX ADMIN — GABAPENTIN SCH MG: 400 CAPSULE ORAL at 15:35

## 2017-03-12 RX ADMIN — MIRTAZAPINE SCH MG: 15 TABLET, FILM COATED ORAL at 20:24

## 2017-03-12 RX ADMIN — MUPIROCIN SCH DOSE: 20 OINTMENT TOPICAL at 20:22

## 2017-03-12 RX ADMIN — NICOTINE SCH PATCH: 21 PATCH, EXTENDED RELEASE TRANSDERMAL at 09:44

## 2017-03-13 VITALS — SYSTOLIC BLOOD PRESSURE: 137 MMHG | DIASTOLIC BLOOD PRESSURE: 77 MMHG

## 2017-03-13 RX ADMIN — MUPIROCIN SCH DOSE: 20 OINTMENT TOPICAL at 08:36

## 2017-03-13 RX ADMIN — GABAPENTIN SCH MG: 400 CAPSULE ORAL at 08:35

## 2017-03-13 RX ADMIN — NICOTINE SCH PATCH: 21 PATCH, EXTENDED RELEASE TRANSDERMAL at 08:35
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
## 2017-04-09 ENCOUNTER — HOSPITAL ENCOUNTER (EMERGENCY)
Dept: HOSPITAL 53 - M ED | Age: 32
Discharge: HOME | End: 2017-04-09
Payer: COMMERCIAL

## 2017-04-09 VITALS — WEIGHT: 150 LBS | HEIGHT: 60 IN | BODY MASS INDEX: 29.45 KG/M2

## 2017-04-09 VITALS — SYSTOLIC BLOOD PRESSURE: 125 MMHG | DIASTOLIC BLOOD PRESSURE: 68 MMHG

## 2017-04-09 DIAGNOSIS — Z88.0: ICD-10-CM

## 2017-04-09 DIAGNOSIS — F41.9: ICD-10-CM

## 2017-04-09 DIAGNOSIS — F32.9: ICD-10-CM

## 2017-04-09 DIAGNOSIS — R07.89: Primary | ICD-10-CM

## 2017-04-09 DIAGNOSIS — F90.9: ICD-10-CM

## 2017-04-09 DIAGNOSIS — Z88.8: ICD-10-CM

## 2017-04-09 DIAGNOSIS — F17.210: ICD-10-CM

## 2017-04-09 DIAGNOSIS — Z79.899: ICD-10-CM

## 2017-04-09 NOTE — REP
PA LATERAL CHEST:  04/09/2017.

 

Comparison:  02/28/2017, 12/19/2009.

 

Clinical history:  Left-sided chest pain.

 

Lungs somewhat hypoinflated with some crowded markings in the bases.  Some mild

bibasilar patchy atelectasis or early infiltrate without dense consolidation or

air bronchograms.  Peribronchial thickening that might reflect reactive airway

disease or bronchitis.  No effusion or lateral pleural thickening.  No

pneumothorax.  Heart size not enlarged for this degree of inflation.  The aorta

is mildly tortuous.  Airway is intact.

 

Impression:

 

1.  Basilar atelectatic or infiltrative change bilaterally, mild.  No dense

consolidation with air bronchograms. Hypoinflated chest.  No cardiomegaly or

pulmonary edema.  No definite effusion.

 

2.  No mediastinal or hilar abnormality.

 

 

Signed by

Herbert Browne MD 04/09/2017 10:55 A

## 2017-04-09 NOTE — ECGEPIP
Stationary ECG Study

                           [facility] - ED

                                       

                                       Test Date:    2017

Pat Name:     [address]               Department:   

Patient ID:   [id number]                 Room:         -

Gender:       M                        Technician:   mr

:          [id number]               Requested By: KRYSTLE WEBER

Order Number: [id number]     Reading MD:   Georgie Delaney

                                 Measurements

Intervals                              Axis          

Rate:         92                       P:            46

LA:           122                      QRS:          49

QRSD:         93                       T:            7

QT:           346                                    

QTc:          430                                    

                           Interpretive Statements

SINUS RHYTHM

POSSIBLE RIGHT VENTRICULAR CONDUCTION DELAY

NONSPECIFIC T-WAVE ABNORMALITY

INCREASED RATE 3/6/17

Electronically Signed On 2017 21:07:02 EDT by Georgie Delaney

## 2017-04-17 ENCOUNTER — HOSPITAL ENCOUNTER (EMERGENCY)
Dept: HOSPITAL 53 - M ED | Age: 32
Discharge: HOME | End: 2017-04-17
Payer: COMMERCIAL

## 2017-04-17 VITALS
BODY MASS INDEX: 28.7 KG/M2 | SYSTOLIC BLOOD PRESSURE: 148 MMHG | HEIGHT: 61 IN | WEIGHT: 152 LBS | DIASTOLIC BLOOD PRESSURE: 83 MMHG

## 2017-04-17 DIAGNOSIS — F43.10: ICD-10-CM

## 2017-04-17 DIAGNOSIS — F32.9: ICD-10-CM

## 2017-04-17 DIAGNOSIS — Z88.6: ICD-10-CM

## 2017-04-17 DIAGNOSIS — Z79.899: ICD-10-CM

## 2017-04-17 DIAGNOSIS — Z76.0: Primary | ICD-10-CM

## 2017-04-17 DIAGNOSIS — Z88.0: ICD-10-CM

## 2017-04-17 DIAGNOSIS — F41.9: ICD-10-CM

## 2017-04-17 DIAGNOSIS — F17.200: ICD-10-CM

## 2017-04-17 DIAGNOSIS — F90.9: ICD-10-CM

## 2017-05-02 ENCOUNTER — HOSPITAL ENCOUNTER (OUTPATIENT)
Dept: HOSPITAL 53 - M LAB REF | Age: 32
End: 2017-05-02
Attending: FAMILY MEDICINE
Payer: COMMERCIAL

## 2017-05-02 DIAGNOSIS — R07.89: Primary | ICD-10-CM

## 2017-05-02 DIAGNOSIS — F41.1: ICD-10-CM

## 2017-05-27 ENCOUNTER — HOSPITAL ENCOUNTER (EMERGENCY)
Dept: HOSPITAL 53 - M ED | Age: 32
LOS: 1 days | Discharge: HOME | End: 2017-05-28
Payer: COMMERCIAL

## 2017-05-27 VITALS — WEIGHT: 140 LBS | BODY MASS INDEX: 26.43 KG/M2 | HEIGHT: 61 IN

## 2017-05-27 DIAGNOSIS — Z76.5: Primary | ICD-10-CM

## 2017-05-27 DIAGNOSIS — Z88.8: ICD-10-CM

## 2017-05-27 DIAGNOSIS — Z79.899: ICD-10-CM

## 2017-05-27 DIAGNOSIS — F31.9: ICD-10-CM

## 2017-05-27 DIAGNOSIS — F17.200: ICD-10-CM

## 2017-05-28 VITALS — SYSTOLIC BLOOD PRESSURE: 130 MMHG | DIASTOLIC BLOOD PRESSURE: 70 MMHG

## 2017-06-24 ENCOUNTER — HOSPITAL ENCOUNTER (EMERGENCY)
Dept: HOSPITAL 53 - M ED | Age: 32
Discharge: HOME | End: 2017-06-24
Payer: COMMERCIAL

## 2017-06-24 VITALS — BODY MASS INDEX: 22.41 KG/M2 | HEIGHT: 65 IN | WEIGHT: 134.48 LBS

## 2017-06-24 VITALS — SYSTOLIC BLOOD PRESSURE: 120 MMHG | DIASTOLIC BLOOD PRESSURE: 68 MMHG

## 2017-06-24 DIAGNOSIS — Z79.899: ICD-10-CM

## 2017-06-24 DIAGNOSIS — Z88.5: ICD-10-CM

## 2017-06-24 DIAGNOSIS — Z88.0: ICD-10-CM

## 2017-06-24 DIAGNOSIS — Z88.8: ICD-10-CM

## 2017-06-24 DIAGNOSIS — R07.89: Primary | ICD-10-CM

## 2017-06-25 ENCOUNTER — HOSPITAL ENCOUNTER (EMERGENCY)
Dept: HOSPITAL 53 - M ED | Age: 32
Discharge: HOME | End: 2017-06-25
Payer: COMMERCIAL

## 2017-06-25 VITALS — HEIGHT: 61 IN | BODY MASS INDEX: 25.56 KG/M2 | WEIGHT: 135.36 LBS

## 2017-06-25 VITALS — DIASTOLIC BLOOD PRESSURE: 68 MMHG | SYSTOLIC BLOOD PRESSURE: 126 MMHG

## 2017-06-25 DIAGNOSIS — Z79.52: ICD-10-CM

## 2017-06-25 DIAGNOSIS — Z79.899: ICD-10-CM

## 2017-06-25 DIAGNOSIS — F17.210: ICD-10-CM

## 2017-06-25 DIAGNOSIS — F32.9: ICD-10-CM

## 2017-06-25 DIAGNOSIS — G89.28: Primary | ICD-10-CM

## 2017-06-25 DIAGNOSIS — F41.9: ICD-10-CM
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
## 2018-05-01 ENCOUNTER — HOSPITAL ENCOUNTER (OUTPATIENT)
Dept: HOSPITAL 53 - M LAB REF | Age: 33
End: 2018-05-01
Attending: FAMILY MEDICINE
Payer: COMMERCIAL

## 2018-05-01 DIAGNOSIS — R07.89: Primary | ICD-10-CM

## 2018-05-01 DIAGNOSIS — F41.1: ICD-10-CM

## 2018-05-01 LAB
AMPHETAMINES UR QL SCN: NEGATIVE
BARBITURATES UR QL SCN: NEGATIVE
BENZODIAZ UR QL SCN: NEGATIVE
BZE UR QL SCN: NEGATIVE
CANNABINOIDS UR QL SCN: NEGATIVE
METHADONE URINE REFLEX: NEGATIVE
OPIATES UR QL SCN: (no result)
PCP UR QL SCN: NEGATIVE

## 2020-06-13 ENCOUNTER — HOSPITAL ENCOUNTER (EMERGENCY)
Dept: HOSPITAL 53 - M ED | Age: 35
Discharge: HOME | End: 2020-06-13
Payer: COMMERCIAL

## 2020-06-13 VITALS — BODY MASS INDEX: 30.88 KG/M2 | HEIGHT: 61 IN | WEIGHT: 163.58 LBS

## 2020-06-13 VITALS — DIASTOLIC BLOOD PRESSURE: 81 MMHG | SYSTOLIC BLOOD PRESSURE: 146 MMHG

## 2020-06-13 DIAGNOSIS — Z79.899: ICD-10-CM

## 2020-06-13 DIAGNOSIS — F31.9: ICD-10-CM

## 2020-06-13 DIAGNOSIS — Z88.0: ICD-10-CM
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
## 2020-06-13 DIAGNOSIS — F43.10: ICD-10-CM

## 2020-06-13 DIAGNOSIS — F90.9: ICD-10-CM

## 2020-06-13 DIAGNOSIS — Z88.8: ICD-10-CM

## 2020-06-13 DIAGNOSIS — S01.81XA: Primary | ICD-10-CM
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
## 2020-06-13 DIAGNOSIS — S80.01XA: ICD-10-CM
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
## 2020-06-13 DIAGNOSIS — F17.200: ICD-10-CM

## 2020-06-13 NOTE — REP
Five views right knee:  06/13/2020.

 

Indication:  Right knee pain following injury.

 

Comparison:  None.

 

Findings:

 

There is no acute fracture, subluxation or dislocation.  Joint space height is

maintained.  No lytic or blastic lesions are present.

 

Impression:

 

No acute fracture.

 

 

Electronically Signed by

Beto Ugarte DO 06/13/2020 03:53 P

## 2021-02-26 ENCOUNTER — HOSPITAL ENCOUNTER (OUTPATIENT)
Dept: HOSPITAL 53 - M LAB REF | Age: 36
End: 2021-02-26
Attending: FAMILY MEDICINE
Payer: COMMERCIAL

## 2021-02-26 DIAGNOSIS — Z91.89: Primary | ICD-10-CM

## 2021-02-26 LAB
AMPHETAMINES UR QL SCN: NEGATIVE
BARBITURATES UR QL SCN: NEGATIVE
BENZODIAZ UR QL SCN: NEGATIVE
BZE UR QL SCN: NEGATIVE
CANNABINOIDS UR QL SCN: (no result)
METHADONE UR QL SCN: (no result)
OPIATES UR QL SCN: NEGATIVE
PCP UR QL SCN: NEGATIVE

## 2021-03-02 LAB
METHADONE UR CFM-MCNC: 735 NG/ML
THC UR CFM-MCNC: 142 NG/ML

## 2021-05-20 ENCOUNTER — HOSPITAL ENCOUNTER (OUTPATIENT)
Dept: HOSPITAL 53 - M LAB REF | Age: 36
End: 2021-05-20
Attending: FAMILY MEDICINE
Payer: COMMERCIAL

## 2021-05-20 DIAGNOSIS — Z11.3: Primary | ICD-10-CM

## 2021-05-20 DIAGNOSIS — E66.9: ICD-10-CM

## 2021-05-20 LAB
ALBUMIN SERPL BCG-MCNC: 4.1 GM/DL (ref 3.2–5.2)
ALT SERPL W P-5'-P-CCNC: 20 U/L (ref 12–78)
BILIRUB SERPL-MCNC: 0.3 MG/DL (ref 0.2–1)
BUN SERPL-MCNC: 10 MG/DL (ref 7–18)
CALCIUM SERPL-MCNC: 9 MG/DL (ref 8.5–10.1)
CHLORIDE SERPL-SCNC: 108 MEQ/L (ref 98–107)
CHOLEST SERPL-MCNC: 186 MG/DL (ref ?–200)
CHOLEST/HDLC SERPL: 3.72 {RATIO} (ref ?–5)
CO2 SERPL-SCNC: 27 MEQ/L (ref 21–32)
CREAT SERPL-MCNC: 0.83 MG/DL (ref 0.7–1.3)
GFR SERPL CREATININE-BSD FRML MDRD: > 60 ML/MIN/{1.73_M2} (ref 60–?)
GLUCOSE SERPL-MCNC: 106 MG/DL (ref 70–100)
HCV AB SER QL: 0 INDEX (ref ?–0.8)
HDLC SERPL-MCNC: 50 MG/DL (ref 40–?)
HIV 1+2 AB+HIV1 P24 AG SERPL QL IA: NEGATIVE
LDLC SERPL CALC-MCNC: 116 MG/DL (ref ?–100)
NONHDLC SERPL-MCNC: 136 MG/DL
POTASSIUM SERPL-SCNC: 4.6 MEQ/L (ref 3.5–5.1)
PROT SERPL-MCNC: 7.1 GM/DL (ref 6.4–8.2)
SODIUM SERPL-SCNC: 139 MEQ/L (ref 136–145)
TRIGL SERPL-MCNC: 100 MG/DL (ref ?–150)
TSH SERPL DL<=0.005 MIU/L-ACNC: 0.88 UIU/ML (ref 0.36–3.74)

## 2023-01-31 ENCOUNTER — HOSPITAL ENCOUNTER (OUTPATIENT)
Dept: HOSPITAL 53 - M LAB REF | Age: 38
End: 2023-01-31
Attending: FAMILY MEDICINE
Payer: COMMERCIAL

## 2023-01-31 DIAGNOSIS — E66.3: Primary | ICD-10-CM

## 2023-01-31 LAB
ALBUMIN SERPL BCG-MCNC: 4 G/DL (ref 3.2–5.2)
ALP SERPL-CCNC: 73 U/L (ref 46–116)
ALT SERPL W P-5'-P-CCNC: 30 U/L (ref 7–40)
AST SERPL-CCNC: 22 U/L (ref ?–34)
BILIRUB SERPL-MCNC: 0.5 MG/DL (ref 0.3–1.2)
BUN SERPL-MCNC: 10 MG/DL (ref 9–23)
CALCIUM SERPL-MCNC: 9.2 MG/DL (ref 8.5–10.1)
CHLORIDE SERPL-SCNC: 105 MMOL/L (ref 98–107)
CHOLEST SERPL-MCNC: 208 MG/DL (ref ?–200)
CHOLEST/HDLC SERPL: 4.7 {RATIO} (ref ?–5)
CO2 SERPL-SCNC: 28 MMOL/L (ref 20–31)
CREAT SERPL-MCNC: 0.85 MG/DL (ref 0.7–1.3)
GFR SERPL CREATININE-BSD FRML MDRD: > 60 ML/MIN/{1.73_M2} (ref 60–?)
GLUCOSE SERPL-MCNC: 100 MG/DL (ref 60–100)
HDLC SERPL-MCNC: 44.2 MG/DL (ref 40–?)
HIV 1+2 AB+HIV1 P24 AG SERPL QL IA: NEGATIVE
LDLC SERPL CALC-MCNC: 132 MG/DL (ref ?–100)
NONHDLC SERPL-MCNC: 164 MG/DL
POTASSIUM SERPL-SCNC: 4.5 MMOL/L (ref 3.5–5.1)
PROT SERPL-MCNC: 7.1 G/DL (ref 5.7–8.2)
SODIUM SERPL-SCNC: 139 MMOL/L (ref 136–145)
TRIGL SERPL-MCNC: 159 MG/DL (ref ?–150)
TSH SERPL DL<=0.005 MIU/L-ACNC: 0.97 UIU/ML (ref 0.55–4.78)

## 2023-06-05 ENCOUNTER — HOSPITAL ENCOUNTER (OUTPATIENT)
Dept: HOSPITAL 53 - M SLEEP HO | Age: 38
End: 2023-06-05
Attending: NURSE PRACTITIONER
Payer: COMMERCIAL

## 2023-06-05 DIAGNOSIS — R06.83: Primary | ICD-10-CM

## 2023-06-07 ENCOUNTER — HOSPITAL ENCOUNTER (OUTPATIENT)
Dept: HOSPITAL 53 - M LAB REF | Age: 38
End: 2023-06-07
Attending: FAMILY MEDICINE
Payer: COMMERCIAL

## 2023-06-07 DIAGNOSIS — Z11.3: Primary | ICD-10-CM

## 2023-06-07 DIAGNOSIS — R53.83: ICD-10-CM

## 2023-06-07 LAB
25(OH)D3 SERPL-MCNC: 15 NG/ML (ref 20–100)
BASOPHILS # BLD AUTO: 0.1 10^3/UL (ref 0–0.2)
BASOPHILS NFR BLD AUTO: 1.2 % (ref 0–1)
EOSINOPHIL # BLD AUTO: 0.1 10^3/UL (ref 0–0.5)
EOSINOPHIL NFR BLD AUTO: 1.1 % (ref 0–3)
HCT VFR BLD AUTO: 45.1 % (ref 42–52)
HCV AB SER QL: 0.1 INDEX (ref ?–0.8)
HGB BLD-MCNC: 14.8 G/DL (ref 13.5–17.5)
LYMPHOCYTES # BLD AUTO: 1.4 10^3/UL (ref 1.5–5)
LYMPHOCYTES NFR BLD AUTO: 21.7 % (ref 24–44)
MCH RBC QN AUTO: 29.7 PG (ref 27–33)
MCHC RBC AUTO-ENTMCNC: 32.8 G/DL (ref 32–36.5)
MCV RBC AUTO: 90.6 FL (ref 80–96)
MONOCYTES # BLD AUTO: 0.6 10^3/UL (ref 0–0.8)
MONOCYTES NFR BLD AUTO: 9.4 % (ref 2–8)
N GONORRHOEA RRNA SPEC QL NAA+PROBE: NEGATIVE
NEUTROPHILS # BLD AUTO: 4.4 10^3/UL (ref 1.5–8.5)
NEUTROPHILS NFR BLD AUTO: 66.1 % (ref 36–66)
PLATELET # BLD AUTO: 313 10^3/UL (ref 150–450)
RBC # BLD AUTO: 4.98 10^6/UL (ref 4.3–6.1)
WBC # BLD AUTO: 6.6 10^3/UL (ref 4–10)